# Patient Record
Sex: FEMALE | Race: WHITE | NOT HISPANIC OR LATINO | Employment: OTHER | URBAN - METROPOLITAN AREA
[De-identification: names, ages, dates, MRNs, and addresses within clinical notes are randomized per-mention and may not be internally consistent; named-entity substitution may affect disease eponyms.]

---

## 2018-03-28 ENCOUNTER — APPOINTMENT (OUTPATIENT)
Dept: RADIOLOGY | Facility: MEDICAL CENTER | Age: 83
DRG: 247 | End: 2018-03-28
Attending: EMERGENCY MEDICINE
Payer: MEDICARE

## 2018-03-28 ENCOUNTER — RESOLUTE PROFESSIONAL BILLING HOSPITAL PROF FEE (OUTPATIENT)
Dept: HOSPITALIST | Facility: MEDICAL CENTER | Age: 83
End: 2018-03-28
Payer: MEDICARE

## 2018-03-28 ENCOUNTER — HOSPITAL ENCOUNTER (INPATIENT)
Facility: MEDICAL CENTER | Age: 83
LOS: 2 days | DRG: 247 | End: 2018-03-30
Attending: EMERGENCY MEDICINE | Admitting: INTERNAL MEDICINE
Payer: MEDICARE

## 2018-03-28 DIAGNOSIS — I20.0 UNSTABLE ANGINA (HCC): ICD-10-CM

## 2018-03-28 DIAGNOSIS — R07.9 ACUTE CHEST PAIN: ICD-10-CM

## 2018-03-28 LAB
ALBUMIN SERPL BCP-MCNC: 3.7 G/DL (ref 3.2–4.9)
ALBUMIN/GLOB SERPL: 1.2 G/DL
ALP SERPL-CCNC: 80 U/L (ref 30–99)
ALT SERPL-CCNC: 10 U/L (ref 2–50)
ANION GAP SERPL CALC-SCNC: 7 MMOL/L (ref 0–11.9)
AST SERPL-CCNC: 14 U/L (ref 12–45)
BASOPHILS # BLD AUTO: 0.7 % (ref 0–1.8)
BASOPHILS # BLD: 0.07 K/UL (ref 0–0.12)
BILIRUB SERPL-MCNC: 0.2 MG/DL (ref 0.1–1.5)
BNP SERPL-MCNC: 26 PG/ML (ref 0–100)
BUN SERPL-MCNC: 26 MG/DL (ref 8–22)
CALCIUM SERPL-MCNC: 9.5 MG/DL (ref 8.5–10.5)
CHLORIDE SERPL-SCNC: 107 MMOL/L (ref 96–112)
CO2 SERPL-SCNC: 27 MMOL/L (ref 20–33)
CREAT SERPL-MCNC: 0.75 MG/DL (ref 0.5–1.4)
EKG IMPRESSION: NORMAL
EOSINOPHIL # BLD AUTO: 0.29 K/UL (ref 0–0.51)
EOSINOPHIL NFR BLD: 2.7 % (ref 0–6.9)
ERYTHROCYTE [DISTWIDTH] IN BLOOD BY AUTOMATED COUNT: 52.2 FL (ref 35.9–50)
GLOBULIN SER CALC-MCNC: 3.1 G/DL (ref 1.9–3.5)
GLUCOSE SERPL-MCNC: 112 MG/DL (ref 65–99)
HCT VFR BLD AUTO: 41.1 % (ref 37–47)
HGB BLD-MCNC: 13.7 G/DL (ref 12–16)
IMM GRANULOCYTES # BLD AUTO: 0.03 K/UL (ref 0–0.11)
IMM GRANULOCYTES NFR BLD AUTO: 0.3 % (ref 0–0.9)
LYMPHOCYTES # BLD AUTO: 2.58 K/UL (ref 1–4.8)
LYMPHOCYTES NFR BLD: 24.2 % (ref 22–41)
MCH RBC QN AUTO: 32.5 PG (ref 27–33)
MCHC RBC AUTO-ENTMCNC: 33.3 G/DL (ref 33.6–35)
MCV RBC AUTO: 97.6 FL (ref 81.4–97.8)
MONOCYTES # BLD AUTO: 0.74 K/UL (ref 0–0.85)
MONOCYTES NFR BLD AUTO: 6.9 % (ref 0–13.4)
NEUTROPHILS # BLD AUTO: 6.94 K/UL (ref 2–7.15)
NEUTROPHILS NFR BLD: 65.2 % (ref 44–72)
NRBC # BLD AUTO: 0 K/UL
NRBC BLD-RTO: 0 /100 WBC
PLATELET # BLD AUTO: 246 K/UL (ref 164–446)
PMV BLD AUTO: 9.7 FL (ref 9–12.9)
POTASSIUM SERPL-SCNC: 4 MMOL/L (ref 3.6–5.5)
PROT SERPL-MCNC: 6.8 G/DL (ref 6–8.2)
RBC # BLD AUTO: 4.21 M/UL (ref 4.2–5.4)
SODIUM SERPL-SCNC: 141 MMOL/L (ref 135–145)
TROPONIN I SERPL-MCNC: <0.01 NG/ML (ref 0–0.04)
WBC # BLD AUTO: 10.7 K/UL (ref 4.8–10.8)

## 2018-03-28 PROCEDURE — 93005 ELECTROCARDIOGRAM TRACING: CPT

## 2018-03-28 PROCEDURE — 85025 COMPLETE CBC W/AUTO DIFF WBC: CPT

## 2018-03-28 PROCEDURE — 700105 HCHG RX REV CODE 258: Performed by: INTERNAL MEDICINE

## 2018-03-28 PROCEDURE — A9270 NON-COVERED ITEM OR SERVICE: HCPCS | Performed by: INTERNAL MEDICINE

## 2018-03-28 PROCEDURE — 99223 1ST HOSP IP/OBS HIGH 75: CPT | Mod: AI | Performed by: INTERNAL MEDICINE

## 2018-03-28 PROCEDURE — 93010 ELECTROCARDIOGRAM REPORT: CPT | Performed by: INTERNAL MEDICINE

## 2018-03-28 PROCEDURE — A9270 NON-COVERED ITEM OR SERVICE: HCPCS | Performed by: EMERGENCY MEDICINE

## 2018-03-28 PROCEDURE — 94760 N-INVAS EAR/PLS OXIMETRY 1: CPT

## 2018-03-28 PROCEDURE — 93005 ELECTROCARDIOGRAM TRACING: CPT | Performed by: EMERGENCY MEDICINE

## 2018-03-28 PROCEDURE — 80053 COMPREHEN METABOLIC PANEL: CPT

## 2018-03-28 PROCEDURE — 700102 HCHG RX REV CODE 250 W/ 637 OVERRIDE(OP): Performed by: EMERGENCY MEDICINE

## 2018-03-28 PROCEDURE — 770020 HCHG ROOM/CARE - TELE (206)

## 2018-03-28 PROCEDURE — 84484 ASSAY OF TROPONIN QUANT: CPT

## 2018-03-28 PROCEDURE — 83880 ASSAY OF NATRIURETIC PEPTIDE: CPT

## 2018-03-28 PROCEDURE — 99285 EMERGENCY DEPT VISIT HI MDM: CPT

## 2018-03-28 PROCEDURE — 93005 ELECTROCARDIOGRAM TRACING: CPT | Performed by: INTERNAL MEDICINE

## 2018-03-28 PROCEDURE — 71045 X-RAY EXAM CHEST 1 VIEW: CPT

## 2018-03-28 PROCEDURE — 700102 HCHG RX REV CODE 250 W/ 637 OVERRIDE(OP): Performed by: INTERNAL MEDICINE

## 2018-03-28 RX ORDER — POLYETHYLENE GLYCOL 3350 17 G/17G
1 POWDER, FOR SOLUTION ORAL
Status: DISCONTINUED | OUTPATIENT
Start: 2018-03-28 | End: 2018-03-30 | Stop reason: HOSPADM

## 2018-03-28 RX ORDER — LEVOTHYROXINE SODIUM 0.05 MG/1
50 TABLET ORAL DAILY
Status: DISCONTINUED | OUTPATIENT
Start: 2018-03-29 | End: 2018-03-30 | Stop reason: HOSPADM

## 2018-03-28 RX ORDER — FEXOFENADINE HCL 60 MG/1
180 TABLET, FILM COATED ORAL DAILY
Status: DISCONTINUED | OUTPATIENT
Start: 2018-03-29 | End: 2018-03-30 | Stop reason: HOSPADM

## 2018-03-28 RX ORDER — METOPROLOL TARTRATE 50 MG/1
50 TABLET, FILM COATED ORAL ONCE
Status: COMPLETED | OUTPATIENT
Start: 2018-03-28 | End: 2018-03-28

## 2018-03-28 RX ORDER — FUROSEMIDE 20 MG/1
20 TABLET ORAL DAILY
Status: DISCONTINUED | OUTPATIENT
Start: 2018-03-29 | End: 2018-03-30 | Stop reason: HOSPADM

## 2018-03-28 RX ORDER — ONDANSETRON 4 MG/1
4 TABLET, ORALLY DISINTEGRATING ORAL EVERY 4 HOURS PRN
Status: DISCONTINUED | OUTPATIENT
Start: 2018-03-28 | End: 2018-03-30 | Stop reason: HOSPADM

## 2018-03-28 RX ORDER — LATANOPROST 50 UG/ML
1 SOLUTION/ DROPS OPHTHALMIC EVERY EVENING
Status: DISCONTINUED | OUTPATIENT
Start: 2018-03-28 | End: 2018-03-30 | Stop reason: HOSPADM

## 2018-03-28 RX ORDER — BISACODYL 10 MG
10 SUPPOSITORY, RECTAL RECTAL
Status: DISCONTINUED | OUTPATIENT
Start: 2018-03-28 | End: 2018-03-30 | Stop reason: HOSPADM

## 2018-03-28 RX ORDER — METOPROLOL SUCCINATE 50 MG/1
50 TABLET, EXTENDED RELEASE ORAL
Status: DISCONTINUED | OUTPATIENT
Start: 2018-03-28 | End: 2018-03-30 | Stop reason: HOSPADM

## 2018-03-28 RX ORDER — AMOXICILLIN 250 MG
2 CAPSULE ORAL 2 TIMES DAILY
Status: DISCONTINUED | OUTPATIENT
Start: 2018-03-28 | End: 2018-03-30 | Stop reason: HOSPADM

## 2018-03-28 RX ORDER — FUROSEMIDE 20 MG/1
20 TABLET ORAL DAILY
COMMUNITY

## 2018-03-28 RX ORDER — ASPIRIN 325 MG
325 TABLET ORAL
Status: DISCONTINUED | OUTPATIENT
Start: 2018-03-28 | End: 2018-03-29

## 2018-03-28 RX ORDER — ONDANSETRON 2 MG/ML
4 INJECTION INTRAMUSCULAR; INTRAVENOUS EVERY 4 HOURS PRN
Status: DISCONTINUED | OUTPATIENT
Start: 2018-03-28 | End: 2018-03-30 | Stop reason: HOSPADM

## 2018-03-28 RX ORDER — ASPIRIN 325 MG
325 TABLET ORAL ONCE
Status: COMPLETED | OUTPATIENT
Start: 2018-03-28 | End: 2018-03-28

## 2018-03-28 RX ORDER — FEXOFENADINE HCL 180 MG/1
180 TABLET ORAL DAILY
COMMUNITY

## 2018-03-28 RX ORDER — ASPIRIN 81 MG/1
324 TABLET, CHEWABLE ORAL
Status: DISCONTINUED | OUTPATIENT
Start: 2018-03-28 | End: 2018-03-29

## 2018-03-28 RX ORDER — ASPIRIN 300 MG/1
300 SUPPOSITORY RECTAL
Status: DISCONTINUED | OUTPATIENT
Start: 2018-03-28 | End: 2018-03-29

## 2018-03-28 RX ORDER — ACETAMINOPHEN 325 MG/1
650 TABLET ORAL EVERY 6 HOURS PRN
Status: DISCONTINUED | OUTPATIENT
Start: 2018-03-28 | End: 2018-03-30 | Stop reason: HOSPADM

## 2018-03-28 RX ORDER — SIMVASTATIN 40 MG
40 TABLET ORAL NIGHTLY
Status: DISCONTINUED | OUTPATIENT
Start: 2018-03-28 | End: 2018-03-30 | Stop reason: HOSPADM

## 2018-03-28 RX ORDER — SODIUM CHLORIDE 9 MG/ML
INJECTION, SOLUTION INTRAVENOUS CONTINUOUS
Status: DISCONTINUED | OUTPATIENT
Start: 2018-03-28 | End: 2018-03-29

## 2018-03-28 RX ADMIN — ASPIRIN 325 MG: 325 TABLET ORAL at 23:35

## 2018-03-28 RX ADMIN — SIMVASTATIN 40 MG: 40 TABLET, FILM COATED ORAL at 23:35

## 2018-03-28 RX ADMIN — ASPIRIN 325 MG: 325 TABLET ORAL at 20:01

## 2018-03-28 RX ADMIN — SODIUM CHLORIDE: 9 INJECTION, SOLUTION INTRAVENOUS at 21:41

## 2018-03-28 RX ADMIN — SODIUM CHLORIDE: 9 INJECTION, SOLUTION INTRAVENOUS at 23:35

## 2018-03-28 RX ADMIN — METOPROLOL TARTRATE 50 MG: 50 TABLET, FILM COATED ORAL at 20:01

## 2018-03-28 RX ADMIN — METOPROLOL SUCCINATE 50 MG: 50 TABLET, EXTENDED RELEASE ORAL at 23:35

## 2018-03-28 ASSESSMENT — COGNITIVE AND FUNCTIONAL STATUS - GENERAL
DRESSING REGULAR UPPER BODY CLOTHING: A LITTLE
WALKING IN HOSPITAL ROOM: A LITTLE
SUGGESTED CMS G CODE MODIFIER DAILY ACTIVITY: CK
MOVING TO AND FROM BED TO CHAIR: A LITTLE
PERSONAL GROOMING: A LITTLE
CLIMB 3 TO 5 STEPS WITH RAILING: A LITTLE
TOILETING: A LITTLE
HELP NEEDED FOR BATHING: A LITTLE
MOBILITY SCORE: 18
SUGGESTED CMS G CODE MODIFIER MOBILITY: CK
TURNING FROM BACK TO SIDE WHILE IN FLAT BAD: A LITTLE
DRESSING REGULAR LOWER BODY CLOTHING: A LITTLE
MOVING FROM LYING ON BACK TO SITTING ON SIDE OF FLAT BED: A LITTLE
STANDING UP FROM CHAIR USING ARMS: A LITTLE
DAILY ACTIVITIY SCORE: 19

## 2018-03-28 ASSESSMENT — PAIN SCALES - GENERAL
PAINLEVEL_OUTOF10: 0

## 2018-03-28 ASSESSMENT — COPD QUESTIONNAIRES
COPD SCREENING SCORE: 7
HAVE YOU SMOKED AT LEAST 100 CIGARETTES IN YOUR ENTIRE LIFE: YES
DURING THE PAST 4 WEEKS HOW MUCH DID YOU FEEL SHORT OF BREATH: SOME OF THE TIME
DO YOU EVER COUGH UP ANY MUCUS OR PHLEGM?: NO/ONLY WITH OCCASIONAL COLDS OR INFECTIONS

## 2018-03-28 ASSESSMENT — LIFESTYLE VARIABLES
ALCOHOL_USE: NO
EVER_SMOKED: NEVER
EVER_SMOKED: YES

## 2018-03-28 ASSESSMENT — PATIENT HEALTH QUESTIONNAIRE - PHQ9
1. LITTLE INTEREST OR PLEASURE IN DOING THINGS: NOT AT ALL
SUM OF ALL RESPONSES TO PHQ9 QUESTIONS 1 AND 2: 0
2. FEELING DOWN, DEPRESSED, IRRITABLE, OR HOPELESS: NOT AT ALL

## 2018-03-29 ENCOUNTER — PATIENT OUTREACH (OUTPATIENT)
Dept: HEALTH INFORMATION MANAGEMENT | Facility: OTHER | Age: 83
End: 2018-03-29

## 2018-03-29 PROBLEM — R07.9 CHEST PAIN: Status: ACTIVE | Noted: 2018-03-29

## 2018-03-29 PROBLEM — I10 HYPERTENSION: Status: ACTIVE | Noted: 2018-03-29

## 2018-03-29 PROBLEM — N39.0 UTI (URINARY TRACT INFECTION): Status: ACTIVE | Noted: 2018-03-29

## 2018-03-29 PROBLEM — E78.5 HYPERLIPIDEMIA: Status: ACTIVE | Noted: 2018-03-29

## 2018-03-29 LAB
ACT BLD: 329 SEC (ref 74–137)
ANION GAP SERPL CALC-SCNC: 7 MMOL/L (ref 0–11.9)
BASOPHILS # BLD AUTO: 0.6 % (ref 0–1.8)
BASOPHILS # BLD: 0.05 K/UL (ref 0–0.12)
BUN SERPL-MCNC: 28 MG/DL (ref 8–22)
CALCIUM SERPL-MCNC: 8.9 MG/DL (ref 8.5–10.5)
CHLORIDE SERPL-SCNC: 108 MMOL/L (ref 96–112)
CHOLEST SERPL-MCNC: 115 MG/DL (ref 100–199)
CO2 SERPL-SCNC: 27 MMOL/L (ref 20–33)
CREAT SERPL-MCNC: 0.82 MG/DL (ref 0.5–1.4)
EKG IMPRESSION: NORMAL
EOSINOPHIL # BLD AUTO: 0.31 K/UL (ref 0–0.51)
EOSINOPHIL NFR BLD: 3.5 % (ref 0–6.9)
ERYTHROCYTE [DISTWIDTH] IN BLOOD BY AUTOMATED COUNT: 52.5 FL (ref 35.9–50)
EST. AVERAGE GLUCOSE BLD GHB EST-MCNC: 148 MG/DL
GLUCOSE BLD-MCNC: 110 MG/DL (ref 65–99)
GLUCOSE BLD-MCNC: 127 MG/DL (ref 65–99)
GLUCOSE BLD-MCNC: 152 MG/DL (ref 65–99)
GLUCOSE BLD-MCNC: 92 MG/DL (ref 65–99)
GLUCOSE SERPL-MCNC: 151 MG/DL (ref 65–99)
HBA1C MFR BLD: 6.8 % (ref 0–5.6)
HCT VFR BLD AUTO: 37.3 % (ref 37–47)
HDLC SERPL-MCNC: 33 MG/DL
HGB BLD-MCNC: 12.2 G/DL (ref 12–16)
IMM GRANULOCYTES # BLD AUTO: 0.03 K/UL (ref 0–0.11)
IMM GRANULOCYTES NFR BLD AUTO: 0.3 % (ref 0–0.9)
INR PPP: 1.06 (ref 0.87–1.13)
LDLC SERPL CALC-MCNC: 60 MG/DL
LV EJECT FRACT  99904: 65
LV EJECT FRACT MOD 2C 99903: 57.65
LV EJECT FRACT MOD 4C 99902: 68.31
LV EJECT FRACT MOD BP 99901: 63.45
LYMPHOCYTES # BLD AUTO: 2.31 K/UL (ref 1–4.8)
LYMPHOCYTES NFR BLD: 26.3 % (ref 22–41)
MCH RBC QN AUTO: 32.1 PG (ref 27–33)
MCHC RBC AUTO-ENTMCNC: 32.7 G/DL (ref 33.6–35)
MCV RBC AUTO: 98.2 FL (ref 81.4–97.8)
MONOCYTES # BLD AUTO: 0.69 K/UL (ref 0–0.85)
MONOCYTES NFR BLD AUTO: 7.8 % (ref 0–13.4)
NEUTROPHILS # BLD AUTO: 5.4 K/UL (ref 2–7.15)
NEUTROPHILS NFR BLD: 61.5 % (ref 44–72)
NRBC # BLD AUTO: 0 K/UL
NRBC BLD-RTO: 0 /100 WBC
PLATELET # BLD AUTO: 226 K/UL (ref 164–446)
PMV BLD AUTO: 9.8 FL (ref 9–12.9)
POTASSIUM SERPL-SCNC: 3.7 MMOL/L (ref 3.6–5.5)
PROTHROMBIN TIME: 13.5 SEC (ref 12–14.6)
RBC # BLD AUTO: 3.8 M/UL (ref 4.2–5.4)
SODIUM SERPL-SCNC: 142 MMOL/L (ref 135–145)
TRIGL SERPL-MCNC: 109 MG/DL (ref 0–149)
TSH SERPL DL<=0.005 MIU/L-ACNC: 2.15 UIU/ML (ref 0.38–5.33)
WBC # BLD AUTO: 8.8 K/UL (ref 4.8–10.8)

## 2018-03-29 PROCEDURE — 700117 HCHG RX CONTRAST REV CODE 255: Performed by: INTERNAL MEDICINE

## 2018-03-29 PROCEDURE — A9270 NON-COVERED ITEM OR SERVICE: HCPCS

## 2018-03-29 PROCEDURE — C1769 GUIDE WIRE: HCPCS

## 2018-03-29 PROCEDURE — 99152 MOD SED SAME PHYS/QHP 5/>YRS: CPT

## 2018-03-29 PROCEDURE — B2111ZZ FLUOROSCOPY OF MULTIPLE CORONARY ARTERIES USING LOW OSMOLAR CONTRAST: ICD-10-PCS | Performed by: INTERNAL MEDICINE

## 2018-03-29 PROCEDURE — 700102 HCHG RX REV CODE 250 W/ 637 OVERRIDE(OP)

## 2018-03-29 PROCEDURE — 82962 GLUCOSE BLOOD TEST: CPT

## 2018-03-29 PROCEDURE — 85610 PROTHROMBIN TIME: CPT

## 2018-03-29 PROCEDURE — 93005 ELECTROCARDIOGRAM TRACING: CPT | Performed by: INTERNAL MEDICINE

## 2018-03-29 PROCEDURE — 700111 HCHG RX REV CODE 636 W/ 250 OVERRIDE (IP): Performed by: INTERNAL MEDICINE

## 2018-03-29 PROCEDURE — 770020 HCHG ROOM/CARE - TELE (206)

## 2018-03-29 PROCEDURE — 93010 ELECTROCARDIOGRAM REPORT: CPT | Performed by: INTERNAL MEDICINE

## 2018-03-29 PROCEDURE — 700111 HCHG RX REV CODE 636 W/ 250 OVERRIDE (IP)

## 2018-03-29 PROCEDURE — 99223 1ST HOSP IP/OBS HIGH 75: CPT | Performed by: INTERNAL MEDICINE

## 2018-03-29 PROCEDURE — C9600 PERC DRUG-EL COR STENT SING: HCPCS | Mod: LD

## 2018-03-29 PROCEDURE — 027034Z DILATION OF CORONARY ARTERY, ONE ARTERY WITH DRUG-ELUTING INTRALUMINAL DEVICE, PERCUTANEOUS APPROACH: ICD-10-PCS | Performed by: INTERNAL MEDICINE

## 2018-03-29 PROCEDURE — 80048 BASIC METABOLIC PNL TOTAL CA: CPT

## 2018-03-29 PROCEDURE — 700101 HCHG RX REV CODE 250

## 2018-03-29 PROCEDURE — 85347 COAGULATION TIME ACTIVATED: CPT

## 2018-03-29 PROCEDURE — B2151ZZ FLUOROSCOPY OF LEFT HEART USING LOW OSMOLAR CONTRAST: ICD-10-PCS | Performed by: INTERNAL MEDICINE

## 2018-03-29 PROCEDURE — C1874 STENT, COATED/COV W/DEL SYS: HCPCS

## 2018-03-29 PROCEDURE — 4A023N7 MEASUREMENT OF CARDIAC SAMPLING AND PRESSURE, LEFT HEART, PERCUTANEOUS APPROACH: ICD-10-PCS | Performed by: INTERNAL MEDICINE

## 2018-03-29 PROCEDURE — 307093 HCHG TR BAND RADIAL

## 2018-03-29 PROCEDURE — C1725 CATH, TRANSLUMIN NON-LASER: HCPCS

## 2018-03-29 PROCEDURE — C1894 INTRO/SHEATH, NON-LASER: HCPCS

## 2018-03-29 PROCEDURE — 93010 ELECTROCARDIOGRAM REPORT: CPT | Mod: 77 | Performed by: INTERNAL MEDICINE

## 2018-03-29 PROCEDURE — 84443 ASSAY THYROID STIM HORMONE: CPT

## 2018-03-29 PROCEDURE — 80061 LIPID PANEL: CPT

## 2018-03-29 PROCEDURE — A9270 NON-COVERED ITEM OR SERVICE: HCPCS | Performed by: INTERNAL MEDICINE

## 2018-03-29 PROCEDURE — C1887 CATHETER, GUIDING: HCPCS

## 2018-03-29 PROCEDURE — 36415 COLL VENOUS BLD VENIPUNCTURE: CPT

## 2018-03-29 PROCEDURE — 93306 TTE W/DOPPLER COMPLETE: CPT | Mod: 26 | Performed by: INTERNAL MEDICINE

## 2018-03-29 PROCEDURE — 85025 COMPLETE CBC W/AUTO DIFF WBC: CPT

## 2018-03-29 PROCEDURE — 700102 HCHG RX REV CODE 250 W/ 637 OVERRIDE(OP): Performed by: INTERNAL MEDICINE

## 2018-03-29 PROCEDURE — 93458 L HRT ARTERY/VENTRICLE ANGIO: CPT

## 2018-03-29 PROCEDURE — 360979 HCHG DIAGNOSTIC CATH

## 2018-03-29 PROCEDURE — 99153 MOD SED SAME PHYS/QHP EA: CPT

## 2018-03-29 PROCEDURE — 700105 HCHG RX REV CODE 258: Performed by: INTERNAL MEDICINE

## 2018-03-29 PROCEDURE — 93306 TTE W/DOPPLER COMPLETE: CPT

## 2018-03-29 PROCEDURE — 83036 HEMOGLOBIN GLYCOSYLATED A1C: CPT

## 2018-03-29 RX ORDER — CLOPIDOGREL BISULFATE 75 MG/1
600 TABLET ORAL ONCE
Status: ACTIVE | OUTPATIENT
Start: 2018-03-29 | End: 2018-03-30

## 2018-03-29 RX ORDER — MIDAZOLAM HYDROCHLORIDE 1 MG/ML
INJECTION INTRAMUSCULAR; INTRAVENOUS
Status: COMPLETED
Start: 2018-03-29 | End: 2018-03-29

## 2018-03-29 RX ORDER — HEPARIN SODIUM,PORCINE 1000/ML
VIAL (ML) INJECTION
Status: COMPLETED
Start: 2018-03-29 | End: 2018-03-29

## 2018-03-29 RX ORDER — VERAPAMIL HYDROCHLORIDE 2.5 MG/ML
INJECTION, SOLUTION INTRAVENOUS
Status: COMPLETED
Start: 2018-03-29 | End: 2018-03-29

## 2018-03-29 RX ORDER — LIDOCAINE HYDROCHLORIDE 20 MG/ML
INJECTION, SOLUTION INFILTRATION; PERINEURAL
Status: COMPLETED
Start: 2018-03-29 | End: 2018-03-29

## 2018-03-29 RX ORDER — CLOPIDOGREL 300 MG/1
TABLET, FILM COATED ORAL
Status: COMPLETED
Start: 2018-03-29 | End: 2018-03-29

## 2018-03-29 RX ORDER — DEXTROSE MONOHYDRATE 25 G/50ML
25 INJECTION, SOLUTION INTRAVENOUS
Status: DISCONTINUED | OUTPATIENT
Start: 2018-03-29 | End: 2018-03-30 | Stop reason: HOSPADM

## 2018-03-29 RX ORDER — CLOPIDOGREL BISULFATE 75 MG/1
75 TABLET ORAL DAILY
Status: DISCONTINUED | OUTPATIENT
Start: 2018-03-30 | End: 2018-03-30 | Stop reason: HOSPADM

## 2018-03-29 RX ORDER — NITROGLYCERIN 0.4 MG/1
0.4 TABLET SUBLINGUAL
Status: DISCONTINUED | OUTPATIENT
Start: 2018-03-29 | End: 2018-03-30 | Stop reason: HOSPADM

## 2018-03-29 RX ORDER — SODIUM CHLORIDE 9 MG/ML
INJECTION, SOLUTION INTRAVENOUS CONTINUOUS
Status: DISCONTINUED | OUTPATIENT
Start: 2018-03-29 | End: 2018-03-30

## 2018-03-29 RX ORDER — ASPIRIN 81 MG/1
81 TABLET, CHEWABLE ORAL DAILY
Status: DISCONTINUED | OUTPATIENT
Start: 2018-03-30 | End: 2018-03-30 | Stop reason: HOSPADM

## 2018-03-29 RX ADMIN — VERAPAMIL HYDROCHLORIDE 2.5 MG: 2.5 INJECTION, SOLUTION INTRAVENOUS at 12:18

## 2018-03-29 RX ADMIN — LIDOCAINE HYDROCHLORIDE: 20 INJECTION, SOLUTION INFILTRATION; PERINEURAL at 13:01

## 2018-03-29 RX ADMIN — MIDAZOLAM 2 MG: 1 INJECTION INTRAMUSCULAR; INTRAVENOUS at 13:09

## 2018-03-29 RX ADMIN — CLOPIDOGREL BISULFATE 600 MG: 300 TABLET, FILM COATED ORAL at 13:38

## 2018-03-29 RX ADMIN — FUROSEMIDE 20 MG: 20 TABLET ORAL at 09:31

## 2018-03-29 RX ADMIN — HEPARIN SODIUM: 1000 INJECTION, SOLUTION INTRAVENOUS; SUBCUTANEOUS at 12:18

## 2018-03-29 RX ADMIN — METOPROLOL SUCCINATE 50 MG: 50 TABLET, EXTENDED RELEASE ORAL at 20:21

## 2018-03-29 RX ADMIN — NITROGLYCERIN 10 ML: 20 INJECTION INTRAVENOUS at 12:18

## 2018-03-29 RX ADMIN — SITAGLIPTIN 100 MG: 100 TABLET, FILM COATED ORAL at 09:31

## 2018-03-29 RX ADMIN — SODIUM CHLORIDE: 9 INJECTION, SOLUTION INTRAVENOUS at 14:19

## 2018-03-29 RX ADMIN — FEXOFENADINE HCL 180 MG: 60 TABLET, FILM COATED ORAL at 09:30

## 2018-03-29 RX ADMIN — LEVOTHYROXINE SODIUM 50 MCG: 50 TABLET ORAL at 09:31

## 2018-03-29 RX ADMIN — HEPARIN SODIUM 2000 UNITS: 200 INJECTION, SOLUTION INTRAVENOUS at 12:19

## 2018-03-29 RX ADMIN — LATANOPROST 1 DROP: 50 SOLUTION OPHTHALMIC at 21:00

## 2018-03-29 RX ADMIN — IOHEXOL 181 ML: 350 INJECTION, SOLUTION INTRAVENOUS at 13:36

## 2018-03-29 RX ADMIN — FENTANYL CITRATE 125 MCG: 50 INJECTION, SOLUTION INTRAMUSCULAR; INTRAVENOUS at 13:25

## 2018-03-29 RX ADMIN — CEFTRIAXONE SODIUM 1 G: 10 INJECTION, POWDER, FOR SOLUTION INTRAVENOUS at 16:08

## 2018-03-29 RX ADMIN — SODIUM CHLORIDE: 9 INJECTION, SOLUTION INTRAVENOUS at 18:48

## 2018-03-29 RX ADMIN — SIMVASTATIN 40 MG: 40 TABLET, FILM COATED ORAL at 20:20

## 2018-03-29 ASSESSMENT — ENCOUNTER SYMPTOMS
COUGH: 1
SEIZURES: 0
FLANK PAIN: 0
DIAPHORESIS: 0
CHILLS: 0
STRIDOR: 0
LOSS OF CONSCIOUSNESS: 0
WEAKNESS: 0
SINUS PAIN: 0
ABDOMINAL PAIN: 0
SPUTUM PRODUCTION: 0
SHORTNESS OF BREATH: 0
SPUTUM PRODUCTION: 1
BRUISES/BLEEDS EASILY: 0
SHORTNESS OF BREATH: 1
FEVER: 0
VOMITING: 0
NAUSEA: 0
COUGH: 0
DIARRHEA: 0
RESPIRATORY NEGATIVE: 1
NERVOUS/ANXIOUS: 1
POLYDIPSIA: 0
MUSCULOSKELETAL NEGATIVE: 1
GASTROINTESTINAL NEGATIVE: 1
HEADACHES: 0
ORTHOPNEA: 1
BLURRED VISION: 0
MYALGIAS: 0
DIZZINESS: 0
CLAUDICATION: 0
ORTHOPNEA: 0
WHEEZING: 0
PSYCHIATRIC NEGATIVE: 1
EYES NEGATIVE: 1
HEMOPTYSIS: 0
NECK PAIN: 0
CONSTITUTIONAL NEGATIVE: 1
CONSTIPATION: 0
HEARTBURN: 0
DOUBLE VISION: 0
PALPITATIONS: 0
TREMORS: 0
FOCAL WEAKNESS: 0

## 2018-03-29 ASSESSMENT — PAIN SCALES - GENERAL
PAINLEVEL_OUTOF10: 0

## 2018-03-29 NOTE — PROGRESS NOTES
Pt returned from cath lab. Report taken from JESSICA Ron. Pt received 600mg Plavix prior to return to unit. VS stable, neurovascular assessment performed and WNL.

## 2018-03-29 NOTE — FACE TO FACE
Face to Face Supporting Documentation - Home Health    The encounter with this patient was in whole or in part the primary reason for home health admission.    Date of encounter:   Patient:                    MRN:                       YOB: 2018  Johanne Baxter  [id number]  9/1/1932     Home health to see patient for:  Skilled Nursing care for assessment, interventions & education    Skilled need for:  Exacerbation of Chronic Disease State Unstable angina    Skilled nursing interventions to include:  Comment: help with medications    Homebound status evidenced by:  Needs the assistance of another person in order to leave the home. Leaving home requires a considerable and taxing effort. There is a normal inability to leave the home.    Community Physician to provide follow up care: Maliha Tran M.D.     Optional Interventions? No      I certify the face to face encounter for this home health care referral meets the CMS requirements and the encounter/clinical assessment with the patient was, in whole, or in part, for the medical condition(s) listed above, which is the primary reason for home health care. Based on my clinical findings: the service(s) are medically necessary, support the need for home health care, and the homebound criteria are met.  I certify that this patient has had a face to face encounter by myself.  Dominik Flores M.D. - NPI: [id number]

## 2018-03-29 NOTE — ED NOTES
"Irregular heart rhythm. EKG performed immediately and signed by MD . Pt denies CP or SOB at this time, states \"I feel ok, don't feel bad at all right now.\" Dr. Stern at bedside now.   "

## 2018-03-29 NOTE — ED PROVIDER NOTES
ED Provider Note    CHIEF COMPLAINT  Chief Complaint   Patient presents with   • Other     Abnormal Stress Test       HPI  Johanne Baxter is a 85 y.o. female who presents to the emergency room today with complaints of chest pain. Patient has had chest pain on and off for the past 2 weeks getting worse with exertion. Her doctor had ordered a stress test on her today which was positive for reversible ischemia apex and lateral wall. Seen at [facility] emergency room and transferred by EMS to our facility. Has risk factors of diabetes hypertensive elevated cholesterol and family history. Currently denies chest pain no shortness breath fever chills or changes in bowel or bladder.    REVIEW OF SYSTEMS  See HPI for further details. All other systems are negative.     PAST MEDICAL HISTORY  Past Medical History:   Diagnosis Date   • DVT (deep venous thrombosis) (CMS-[facility]) 1992    related to hip surgery right leg   • Abnormal Pap smear    • Arthritis    • ASTHMA    • CATARACT    • Depression    • Diabetes    • Glaucoma    • Headache(784.0)    • Hyperlipidemia    • Hypertension    • MRSA (methicillin resistant staph aureus) culture positive    • Thyroid disease    • TIA (transient ischemic attack)    • TIA (transient ischemic attack)     resolved with Agrenox   • Urinary tract infection, site not specified        FAMILY HISTORY  [unfilled]    SOCIAL HISTORY  Social History     Social History   • Marital status: Single     Spouse name: N/A   • Number of children: N/A   • Years of education: N/A     Social History Main Topics   • Smoking status: Former Smoker   • Smokeless tobacco: Never Used   • Alcohol use No   • Drug use: No   • Sexual activity: Not on file     Other Topics Concern   • Not on file     Social History Narrative   • No narrative on file       SURGICAL HISTORY  Past Surgical History:   Procedure Laterality Date   • OTHER      hysterectomy, hip replacement times 3 sinus surgery       CURRENT MEDICATIONS  Home  "Medications     Reviewed by Trang Muñoz R.N. (Registered Nurse) on 03/28/18 at 1941  Med List Status: Not Addressed   Medication Last Dose Status   aspirin-dipyridamole (AGGRENOX)  MG CAPSULE SR 12 HR 7/28/2016 Active   Cyanocobalamin (EQL VITAMIN B-12 PO) 7/28/2016 Active   LEVOTHYROXINE SODIUM PO 7/28/2016 Active   Linagliptin (TRADJENTA) 5 MG TABS 7/28/2016 Active   metoprolol SR (TOPROL XL) 50 MG TB24 7/28/2016 Active   Multiple Vitamins-Minerals (PRESERVISION AREDS PO) 7/28/2016 Active   simvastatin (ZOCOR) 40 MG TABS 7/27/2016 Active   travoprost (TRAVATAN Z) 0.004 % SOLN 7/27/2016 Active                ALLERGIES  Allergies   Allergen Reactions   • Novocain    • Pcn [Penicillins]    • Sulfa Drugs        PHYSICAL EXAM  VITAL SIGNS: /89   Pulse 71   Temp 36.5 °C (97.7 °F)   Resp 20   Ht 1.651 m (5' 5\")   Wt 99.8 kg (220 lb)   SpO2 97%   BMI 36.61 kg/m²  Room air O2: 90    Constitutional: Well developed, Well nourished, No acute distress, Non-toxic appearance.   HENT: Normocephalic, Atraumatic, Bilateral external ears normal, Oropharynx moist, No oral exudates, Nose normal.   Eyes: PERRLA, EOMI, Conjunctiva normal, No discharge.   Neck: Normal range of motion, No tenderness, Supple, No stridor.   Lymphatic: No lymphadenopathy noted.   Cardiovascular: Normal heart rate, Normal rhythm, No murmurs, No rubs, No gallops.   Thorax & Lungs: Normal breath sounds, No respiratory distress, No wheezing, No chest tenderness.   Abdomen: Bowel sounds normal, Soft, No tenderness, No masses, No pulsatile masses.   Skin: Warm, Dry, No erythema, No rash.   Back: No tenderness, No CVA tenderness.   Extremities: Intact distal pulses, lower extremity edema, No tenderness, No cyanosis, No clubbing.   Musculoskeletal: Good range of motion in all major joints. No tenderness to palpation or major deformities noted.   Neurologic: Alert & oriented x 3, Normal motor function, Normal sensory function, No focal " deficits noted.   Psychiatric: Affect normal, Judgment normal, Mood normal.     EKG  Normal sinus rhythm 70 beats per minute, no ST elevation or depression, widening QRS complexes noted, MT intervals are normal ,no diagnostic Q waves noted. This is a 12-lead EKG is no previous EKG available at this time,    RADIOLOGY/PROCEDURES  DX-CHEST-PORTABLE (1 VIEW)    (Results Pending)   ECHOCARDIOGRAM COMP W/O CONT    (Results Pending)         COURSE & MEDICAL DECISION MAKING  Pertinent Labs & Imaging studies reviewed. (See chart for details)  Discussed case with hospitalist as well as with cardiology patient admitted for cardiac catheterization and further diagnostic studies. Given aspirin here in the emergency room.    FINAL IMPRESSION  1. Acute chest pain  2. Diabetes mellitus by history  3. Hypertension by history  4. Hypercholesteremia         Electronically signed by: Pablo Stern, 3/28/2018 8:36 PM

## 2018-03-29 NOTE — PROGRESS NOTES
2 RN skin check completed with Devora Mckinney. Dryness noted to bilat heels, pink blanching area noted to rt ear where glasses sit. Pink blanching area noted to buttocks.

## 2018-03-29 NOTE — PROGRESS NOTES
[facility]    Date of Service: 3/29/2018    Chief Complaint  85 y.o. female admitted 3/28/2018 with normal stress test for cardiac catheterization.    Interval Problem Update  Patient out of flap. Reportedly, coronary artery stents were placed, full report pending. Patient denies chest pain or shortness of breath.. Right forearm artery was accessed. Review of system positive for dysuria, frequency. Antibiotic empirically for UTI will be initiated. Home health order placed  Consultants/Specialty  Cardiology     Disposition  Home with home health        Review of Systems   Constitutional: Negative.  Negative for chills and fever.   HENT: Negative.  Negative for congestion and sinus pain.    Eyes: Negative.  Negative for double vision.   Respiratory: Negative.  Negative for cough, hemoptysis, sputum production, shortness of breath and stridor.    Cardiovascular: Positive for leg swelling. Negative for palpitations, orthopnea and claudication.   Gastrointestinal: Negative.  Negative for diarrhea, heartburn and nausea.   Genitourinary: Positive for dysuria, frequency and urgency. Negative for flank pain and hematuria.   Musculoskeletal: Negative.  Negative for myalgias and neck pain.   Skin: Negative.  Negative for itching and rash.   Neurological: Negative for dizziness, tremors, seizures and headaches.   Endo/Heme/Allergies: Negative for polydipsia. Does not bruise/bleed easily.   Psychiatric/Behavioral: Negative.    All other systems reviewed and are negative.     Physical Exam  Laboratory/Imaging   Hemodynamics  Temp (24hrs), Av.4 °C (97.5 °F), Min:36.1 °C (96.9 °F), Max:36.7 °C (98 °F)   Temperature: 36.4 °C (97.6 °F)  Pulse  Av.6  Min: 63  Max: 107 Heart Rate (Monitored): (!) 102  Blood Pressure : 129/68, NIBP: 146/80      Respiratory      Respiration: 15, Pulse Oximetry: 93 %, O2 Daily Delivery Respiratory : Room Air with O2 Available             Fluids  No intake or output data in  the 24 hours ending 03/29/18 1611    Nutrition  Orders Placed This Encounter   Procedures   • Diet Order     Standing Status:   Standing     Number of Occurrences:   1     Order Specific Question:   Diet:     Answer:   Cardiac [6]     Physical Exam   Constitutional: She is oriented to person, place, and time. She appears well-developed and well-nourished.   HENT:   Head: Normocephalic and atraumatic.   Eyes: EOM are normal. Pupils are equal, round, and reactive to light.   Neck: Normal range of motion. Neck supple.   Cardiovascular: Normal rate and regular rhythm.    Pulmonary/Chest: Effort normal and breath sounds normal. No respiratory distress. She has no wheezes.   Abdominal: Soft. Bowel sounds are normal. There is no tenderness.   Musculoskeletal: Normal range of motion.   Neurological: She is alert and oriented to person, place, and time.   Skin: Skin is warm and dry.   Psychiatric: She has a normal mood and affect.   Nursing note and vitals reviewed.      Recent Labs      03/28/18 1950 03/29/18   0353   WBC  10.7  8.8   RBC  4.21  3.80*   HEMOGLOBIN  13.7  12.2   HEMATOCRIT  41.1  37.3   MCV  97.6  98.2*   MCH  32.5  32.1   MCHC  33.3*  32.7*   RDW  52.2*  52.5*   PLATELETCT  246  226   MPV  9.7  9.8     Recent Labs      03/28/18   1950  03/29/18   0353   SODIUM  141  142   POTASSIUM  4.0  3.7   CHLORIDE  107  108   CO2  27  27   GLUCOSE  112*  151*   BUN  26*  28*   CREATININE  0.75  0.82   CALCIUM  9.5  8.9     Recent Labs      03/29/18   0831   INR  1.06     Recent Labs      03/28/18   1950   BNPBTYPENAT  26     Recent Labs      03/29/18   0353   TRIGLYCERIDE  109   HDL  33*   LDL  60          Assessment/Plan     Chest pain- (present on admission)   Assessment & Plan      EKG reveals no acute ischemic changes, troponin is less than 0.01  Recent NM cardiac stress test reveals reversible ischemia  Cardiology has been consulted  Patient undergone coronary angiography, reportedly stents placed  Continue  metoprolol and statin, antiplatelets per cardiology        UTI (urinary tract infection)   Assessment & Plan    Started empirically on ceftriaxone          Hyperlipidemia   Assessment & Plan    Continue statin        Hypertension   Assessment & Plan    Well-controlled  Continue Toprol-XL        Type 2 diabetes mellitus (CMS-[facility])- (present on admission)   Assessment & Plan    Insulin sliding scale with serial Accu-Cheks  Continue Januvia          Quality-Core Measures

## 2018-03-29 NOTE — H&P
Hospital Medicine History and Physical    Date of Service  3/28/2018    Chief Complaint  Chief Complaint   Patient presents with   • Other     Abnormal Stress Test       History of Presenting Illness  85 y.o. female with a past medical history of diabetes mellitus, TIA, hypertension, hyperlipidemia who presented 3/28/2018 with intermittent chest pain for the past 2 weeks. The patient reports substernal, pressure-like chest pain which is nonexertional related at 4/10 intensity. She denies any alleviating or exacerbating factors. It is associated with shortness of breath. She denies any fever or cough nausea or diaphoresis. Patient denies any smoking history. Patient reports a family history of premature cardiac disease, her father had an MI at the age of 47. The patient underwent a nuclear medicine cardiac stress test at an outlying facility that revealed reversible ischemia. Cardiology was consulted by the ER physician. The patient is scheduled to undergo coronary angiography tomorrow.      Primary Care Physician  Maliha Tran M.D.    Consultants  Cardiology    Code Status  Full Code    Review of Systems  Review of Systems   Constitutional: Negative for chills and fever.   Respiratory: Positive for shortness of breath. Negative for cough, sputum production and wheezing.    Cardiovascular: Positive for chest pain.   All other systems reviewed and are negative.       Past Medical History  Past Medical History:   Diagnosis Date   • DVT (deep venous thrombosis) (CMS-H[facility]) 1992    related to hip surgery right leg   • Abnormal Pap smear    • Arthritis    • ASTHMA    • CATARACT    • Depression    • Diabetes    • Glaucoma    • Headache(784.0)    • Hyperlipidemia    • Hypertension    • MRSA (methicillin resistant staph aureus) culture positive    • Thyroid disease    • TIA (transient ischemic attack)    • TIA (transient ischemic attack)     resolved with Agrenox   • Urinary tract infection, site not specified        Surgical  History  Past Surgical History:   Procedure Laterality Date   • OTHER      hysterectomy, hip replacement times 3 sinus surgery       Medications  No current facility-administered medications on file prior to encounter.      Current Outpatient Prescriptions on File Prior to Encounter   Medication Sig Dispense Refill   • cyanocobalamin (EQL VITAMIN B-12) 500 MCG tablet Take 1 Tab by mouth every day.     • aspirin-dipyridamole (AGGRENOX)  MG CAPSULE SR 12 HR Take 1 Cap by mouth 2 times a day.     • Multiple Vitamins-Minerals (PRESERVISION AREDS PO) Take 1 Tab by mouth 2 times a day.     • levothyroxine (SYNTHROID) 50 MCG Tab Take 50 mcg by mouth every day.     • metoprolol SR (TOPROL XL) 50 MG TB24 Take 50 mg by mouth every day.     • simvastatin (ZOCOR) 40 MG TABS Take 40 mg by mouth every evening.     • Linagliptin (TRADJENTA) 5 MG TABS Take 5 mg by mouth every day.     • travoprost (TRAVATAN Z) 0.004 % SOLN Place 1 Drop in both eyes every evening.         Family History  Family History   Problem Relation Age of Onset   • Heart Disease Mother    • Heart Disease Father    • Heart Disease Brother    • Heart Disease Sister        Social History  Social History   Substance Use Topics   • Smoking status: Former Smoker   • Smokeless tobacco: Never Used   • Alcohol use No       Allergies  Allergies   Allergen Reactions   • Novocain      Pt was unsure of the rxn   • Pcn [Penicillins]      Pt reported it makes her feel drunk   • Sulfa Drugs      Pt was unsure of rxn        Physical Exam  Laboratory   Hemodynamics  Temp (24hrs), Av.5 °C (97.7 °F), Min:36.5 °C (97.7 °F), Max:36.5 °C (97.7 °F)   Temperature: 36.5 °C (97.7 °F)  Pulse  Av.7  Min: 71  Max: 85 Heart Rate (Monitored): (!) 106  Blood Pressure : 146/89, NIBP: 130/68      Respiratory      Respiration: 20, Pulse Oximetry: 97 %             Physical Exam   Constitutional: She appears well-developed and well-nourished. No distress.   HENT:   Head:  Normocephalic and atraumatic.   Mouth/Throat: Oropharynx is clear and moist.   Eyes: Conjunctivae are normal.   Neck: Neck supple.   Cardiovascular: Normal rate, regular rhythm and normal heart sounds.    Pulmonary/Chest: Effort normal and breath sounds normal. No respiratory distress. She has no wheezes.   Nursing note and vitals reviewed.      Recent Labs      03/28/18 1950   WBC  10.7   RBC  4.21   HEMOGLOBIN  13.7   HEMATOCRIT  41.1   MCV  97.6   MCH  32.5   MCHC  33.3*   RDW  52.2*   PLATELETCT  246   MPV  9.7     Recent Labs      03/28/18 1950   SODIUM  141   POTASSIUM  4.0   CHLORIDE  107   CO2  27   GLUCOSE  112*   BUN  26*   CREATININE  0.75   CALCIUM  9.5     Recent Labs      03/28/18 1950   ALTSGPT  10   ASTSGOT  14   ALKPHOSPHAT  80   TBILIRUBIN  0.2   GLUCOSE  112*         Recent Labs      03/28/18 1950   BNPBTYPENAT  26         Lab Results   Component Value Date    TROPONINI <0.01 03/28/2018     Urinalysis:  No results found for: SPECGRAVITY, GLUCOSEUR, KETONES, NITRITE, WBCURINE, RBCURINE, BACTERIA, EPITHELCELL     Imaging  DX-CHEST-PORTABLE (1 VIEW)   Final Result      1.  Stable mild cardiomegaly.   2.  Mild pulmonary vascular congestion.   3.  Minimal LEFT midlung atelectasis or scar.   4.  No pneumonia or pulmonary edema.      ECHOCARDIOGRAM COMP W/O CONT    (Results Pending)        Assessment/Plan     I anticipate this patient will require at least two midnights for appropriate medical management, necessitating inpatient admission.    Chest pain- (present on admission)   Assessment & Plan    Concerning for ACS  EKG reveals no acute ischemic changes, troponin is less than 0.01  Recent NM cardiac stress test reveals reversible ischemia  Cardiology has been consulted  Patient will undergo coronary angiography tomorrow   She has received a full dose of aspirin  Continue metoprolol and statin        Hyperlipidemia   Assessment & Plan    Continue statin        Hypertension   Assessment & Plan     Well-controlled  Continue Toprol-XL        Type 2 diabetes mellitus (CMS-HCC)- (present on admission)   Assessment & Plan    Insulin sliding scale with serial Accu-Cheks  Continue Januvia            VTE prophylaxis: lovenox.

## 2018-03-29 NOTE — DISCHARGE PLANNING
Transitional Care Navigator:    Chart reviewed for post acute needs.  Pt is an 85 yof who lives alone in [place].  Her mobility in hospital has been limited to 10 feet, and up to BSC; her LACE score is 49.  If patient's mobility remains low, she would be an appropriate candidate for short term home health follow up.  Please consider an order for HH.

## 2018-03-29 NOTE — ED TRIAGE NOTES
Pt sent from [facility] for abnormal stress test. Pt denies CP or SOB.

## 2018-03-29 NOTE — PROGRESS NOTES
"DATE OF CONSULTATION: 3/28/18    REASON FOR CONSULTATION: Abnormal stress test    HISTORY OF PRESENT ILLNESS:   85 year old with a PMH of HTN, DLD, TIAx2 presented from [place] after having an abnormal NM- stress test at an outside facility. Patient reports having 2 week symptomology of substernal chest pain which is atypical in nature, aggravated with lying down, not associated with exertion, not associated with rest, and \"achy\" in nature. This is associated with shortness of breath, productive cough with white phlegm production, and exertional dyspnea. Denies any palpitations, dizziness, LOC, nausea, vomiting, blurry vision, or active tobacco abuse.     She has an extensive family Hx of CAD. Ex-smoker quit in the 60's.     PAST MEDICAL HISTORY:  Past Medical History:   Diagnosis Date   • Abnormal Pap smear    • Arthritis    • ASTHMA    • CATARACT    • Depression    • Diabetes    • DVT (deep venous thrombosis) (CMS-[facility]) 1992    related to hip surgery right leg   • Glaucoma    • Headache(784.0)    • Hyperlipidemia    • Hypertension    • MRSA (methicillin resistant staph aureus) culture positive    • Thyroid disease    • TIA (transient ischemic attack)    • TIA (transient ischemic attack)     resolved with Agrenox   • Urinary tract infection, site not specified          SURGICAL HISTORY:  Past Surgical History:   Procedure Laterality Date   • OTHER      hysterectomy, hip replacement times 3 sinus surgery   • OTHER ORTHOPEDIC SURGERY Right     rtr hip x3, last on in 2006         SOCIAL HISTORY:  Social History     Social History   • Marital status: Single     Spouse name: N/A   • Number of children: N/A   • Years of education: N/A     Occupational History   • Not on file.     Social History Main Topics   • Smoking status: Former Smoker   • Smokeless tobacco: Never Used   • Alcohol use No   • Drug use: No   • Sexual activity: Not on file     Other Topics Concern   • Not on file     Social History Narrative   • No " narrative on file         FAMILY HISTORY:  Family History   Problem Relation Age of Onset   • Heart Disease Mother    • Heart Disease Father    • Heart Disease Brother    • Heart Disease Sister          HOME MEDICATIONS:   Prior to Admission medications    Medication Sig Start Date End Date Taking? Authorizing Provider   furosemide (LASIX) 20 MG Tab Take 20 mg by mouth every day.   Yes Physician Outpatient   fexofenadine (ALLEGRA) 180 MG tablet Take 180 mg by mouth every day.   Yes Physician Outpatient   cyanocobalamin (EQL VITAMIN B-12) 500 MCG tablet Take 1 Tab by mouth every day.    Physician Outpatient   aspirin-dipyridamole (AGGRENOX)  MG CAPSULE SR 12 HR Take 1 Cap by mouth 2 times a day.    Physician Outpatient   Multiple Vitamins-Minerals (PRESERVISION AREDS PO) Take 1 Tab by mouth 2 times a day.    Physician Outpatient   levothyroxine (SYNTHROID) 50 MCG Tab Take 50 mcg by mouth every day.    Physician Outpatient   metoprolol SR (TOPROL XL) 50 MG TB24 Take 50 mg by mouth every day.    Physician Outpatient   simvastatin (ZOCOR) 40 MG TABS Take 40 mg by mouth every evening.    Physician Outpatient   Linagliptin (TRADJENTA) 5 MG TABS Take 5 mg by mouth every day.    Physician Outpatient   travoprost (TRAVATAN Z) 0.004 % SOLN Place 1 Drop in both eyes every evening.    Physician Outpatient         REVIEW OF SYSTEMS:  Review of Systems   Constitutional: Negative for chills, diaphoresis and fever.   HENT: Negative for hearing loss.    Eyes: Negative for blurred vision.   Respiratory: Positive for cough, sputum production and shortness of breath. Negative for hemoptysis.    Cardiovascular: Positive for chest pain, orthopnea and leg swelling. Negative for palpitations and claudication.   Gastrointestinal: Negative for abdominal pain, constipation, diarrhea, nausea and vomiting.   Genitourinary: Negative for dysuria.   Musculoskeletal: Negative for myalgias.   Skin: Negative for rash.   Neurological: Negative  "for dizziness, focal weakness, loss of consciousness and weakness.   Psychiatric/Behavioral: The patient is nervous/anxious.    All other systems reviewed and are negative.      PHYSICAL EXAMINATION:  Vitals:    03/28/18 2346 03/29/18 0023 03/29/18 0411 03/29/18 0820   BP:  119/65 119/64 129/68   Pulse: (!) 107 77 63 73   Resp: (!) 50 16 18 15   Temp:  36.1 °C (96.9 °F) 36.4 °C (97.5 °F) 36.4 °C (97.6 °F)   SpO2: 98% 90% 98% 93%   Weight:       Height:         Body mass index is 36.47 kg/m².  /68   Pulse 73   Temp 36.4 °C (97.6 °F)   Resp 15   Ht 1.651 m (5' 5\")   Wt 99.4 kg (219 lb 2.2 oz)   SpO2 93%   Breastfeeding? No   BMI 36.47 kg/m²   O2 therapy: Pulse Oximetry: 93 %, O2 (LPM): 2, O2 Delivery: Silicone Nasal Cannula    Physical Exam   Constitutional: She is oriented to person, place, and time and well-developed, well-nourished, and in no distress. No distress.   HENT:   Head: Normocephalic and atraumatic.   No carotid bruits   Eyes: EOM are normal.   Neck: Normal range of motion. Neck supple.   JVD cannot be assessed due to thick neck.    Cardiovascular: Normal rate, regular rhythm and normal heart sounds.  Exam reveals no gallop and no friction rub.    No murmur heard.  Pulmonary/Chest: Effort normal and breath sounds normal. No respiratory distress. She has no wheezes. She has no rales.   Abdominal: Soft. Bowel sounds are normal.   Musculoskeletal: Normal range of motion. She exhibits edema.   Neurological: She is alert and oriented to person, place, and time. GCS score is 15.   Skin: Skin is warm. She is not diaphoretic.   Psychiatric: Mood and affect normal.       LABORATORY DATA:     Recent Results (from the past 24 hour(s))   CBC WITH DIFFERENTIAL    Collection Time: 03/28/18  7:50 PM   Result Value Ref Range    WBC 10.7 4.8 - 10.8 K/uL    RBC 4.21 4.20 - 5.40 M/uL    Hemoglobin 13.7 12.0 - 16.0 g/dL    Hematocrit 41.1 37.0 - 47.0 %    MCV 97.6 81.4 - 97.8 fL    MCH 32.5 27.0 - 33.0 pg    " MCHC 33.3 (L) 33.6 - 35.0 g/dL    RDW 52.2 (H) 35.9 - 50.0 fL    Platelet Count 246 164 - 446 K/uL    MPV 9.7 9.0 - 12.9 fL    Neutrophils-Polys 65.20 44.00 - 72.00 %    Lymphocytes 24.20 22.00 - 41.00 %    Monocytes 6.90 0.00 - 13.40 %    Eosinophils 2.70 0.00 - 6.90 %    Basophils 0.70 0.00 - 1.80 %    Immature Granulocytes 0.30 0.00 - 0.90 %    Nucleated RBC 0.00 /100 WBC    Neutrophils (Absolute) 6.94 2.00 - 7.15 K/uL    Lymphs (Absolute) 2.58 1.00 - 4.80 K/uL    Monos (Absolute) 0.74 0.00 - 0.85 K/uL    Eos (Absolute) 0.29 0.00 - 0.51 K/uL    Baso (Absolute) 0.07 0.00 - 0.12 K/uL    Immature Granulocytes (abs) 0.03 0.00 - 0.11 K/uL    NRBC (Absolute) 0.00 K/uL   COMP METABOLIC PANEL    Collection Time: 03/28/18  7:50 PM   Result Value Ref Range    Sodium 141 135 - 145 mmol/L    Potassium 4.0 3.6 - 5.5 mmol/L    Chloride 107 96 - 112 mmol/L    Co2 27 20 - 33 mmol/L    Anion Gap 7.0 0.0 - 11.9    Glucose 112 (H) 65 - 99 mg/dL    Bun 26 (H) 8 - 22 mg/dL    Creatinine 0.75 0.50 - 1.40 mg/dL    Calcium 9.5 8.5 - 10.5 mg/dL    AST(SGOT) 14 12 - 45 U/L    ALT(SGPT) 10 2 - 50 U/L    Alkaline Phosphatase 80 30 - 99 U/L    Total Bilirubin 0.2 0.1 - 1.5 mg/dL    Albumin 3.7 3.2 - 4.9 g/dL    Total Protein 6.8 6.0 - 8.2 g/dL    Globulin 3.1 1.9 - 3.5 g/dL    A-G Ratio 1.2 g/dL   TROPONIN    Collection Time: 03/28/18  7:50 PM   Result Value Ref Range    Troponin I <0.01 0.00 - 0.04 ng/mL   BTYPE NATRIURETIC PEPTIDE    Collection Time: 03/28/18  7:50 PM   Result Value Ref Range    B Natriuretic Peptide 26 0 - 100 pg/mL   ESTIMATED GFR    Collection Time: 03/28/18  7:50 PM   Result Value Ref Range    GFR If African American >60 >60 mL/min/1.73 m 2    GFR If Non African American >60 >60 mL/min/1.73 m 2   EKG (ER)    Collection Time: 03/28/18 11:38 PM   Result Value Ref Range    Report       [facility]    Test Date:  2018-03-28  Pt Name:    ANNA SHERMAN               Department: ER  MRN:        [id number]                       Room:       [facility]  Gender:     Female                       Technician: GIANFRANCO  :        [id number]                   Requested By:ER TRIAGE PROTOCOL  Order #:    [id number]                    Reading MD: Orestes Salcedo MD    Measurements  Intervals                                Axis  Rate:       71                           P:          0  NY:         248                          QRS:        18  QRSD:       90                           T:          57  QT:         404  QTc:        439    Interpretive Statements  SINUS RHYTHM  FIRST DEGREE AV BLOCK  INFERIOR ST ELEVATION PROBABLY BENIGN EARLY REPOLARIZATION  Compared to ECG 2016 14:09:47  No significant changes    Electronically Signed On 3- 8:45:59 PDT by Orestes Salcedo MD     CBC with Differential    Collection Time: 18  3:53 AM   Result Value Ref Range    WBC 8.8 4.8 - 10.8 K/uL    RBC 3.80 (L) 4.20 - 5.40 M/uL    Hemoglobin 12.2 12.0 - 16.0 g/dL    Hematocrit 37.3 37.0 - 47.0 %    MCV 98.2 (H) 81.4 - 97.8 fL    MCH 32.1 27.0 - 33.0 pg    MCHC 32.7 (L) 33.6 - 35.0 g/dL    RDW 52.5 (H) 35.9 - 50.0 fL    Platelet Count 226 164 - 446 K/uL    MPV 9.8 9.0 - 12.9 fL    Neutrophils-Polys 61.50 44.00 - 72.00 %    Lymphocytes 26.30 22.00 - 41.00 %    Monocytes 7.80 0.00 - 13.40 %    Eosinophils 3.50 0.00 - 6.90 %    Basophils 0.60 0.00 - 1.80 %    Immature Granulocytes 0.30 0.00 - 0.90 %    Nucleated RBC 0.00 /100 WBC    Neutrophils (Absolute) 5.40 2.00 - 7.15 K/uL    Lymphs (Absolute) 2.31 1.00 - 4.80 K/uL    Monos (Absolute) 0.69 0.00 - 0.85 K/uL    Eos (Absolute) 0.31 0.00 - 0.51 K/uL    Baso (Absolute) 0.05 0.00 - 0.12 K/uL    Immature Granulocytes (abs) 0.03 0.00 - 0.11 K/uL    NRBC (Absolute) 0.00 K/uL   Basic Metabolic Panel (BMP)    Collection Time: 18  3:53 AM   Result Value Ref Range    Sodium 142 135 - 145 mmol/L    Potassium 3.7 3.6 - 5.5 mmol/L    Chloride 108 96 - 112 mmol/L    Co2 27 20 - 33 mmol/L    Glucose 151 (H) 65 - 99 mg/dL     Bun 28 (H) 8 - 22 mg/dL    Creatinine 0.82 0.50 - 1.40 mg/dL    Calcium 8.9 8.5 - 10.5 mg/dL    Anion Gap 7.0 0.0 - 11.9   TSH (Thyroid Stimulating Hormone)    Collection Time: 18  3:53 AM   Result Value Ref Range    TSH 2.150 0.380 - 5.330 uIU/mL   Lipid Profile (Lipid Panel) Fasting    Collection Time: 18  3:53 AM   Result Value Ref Range    Cholesterol,Tot 115 100 - 199 mg/dL    Triglycerides 109 0 - 149 mg/dL    HDL 33 (A) >=40 mg/dL    LDL 60 <100 mg/dL   ESTIMATED GFR    Collection Time: 18  3:53 AM   Result Value Ref Range    GFR If African American >60 >60 mL/min/1.73 m 2    GFR If Non African American >60 >60 mL/min/1.73 m 2   EKG in four (4) hours    Collection Time: 18  5:47 AM   Result Value Ref Range    Report       [facility]    Test Date:  2018  Pt Name:    ANNA WHITAKER               Department: [facility]  MRN:        [id number]                      Room:       [facility]  Gender:     Female                       Technician: GIANFRANCO  :        [id number]                   Requested By:EASTON VALDEZ  Order #:    [id number]                    Reading MD:    Measurements  Intervals                                Axis  Rate:       66                           P:          62  NJ:         244                          QRS:        80  QRSD:       86                           T:          69  QT:         404  QTc:        424    Interpretive Statements  SINUS RHYTHM  FIRST DEGREE AV BLOCK  Compared to ECG 2018 23:38:13  No significant changes     ACCU-CHEK GLUCOSE    Collection Time: 18  6:06 AM   Result Value Ref Range    Glucose - Accu-Ck 110 (H) 65 - 99 mg/dL   PROTHROMBIN TIME    Collection Time: 18  8:31 AM   Result Value Ref Range    PT 13.5 12.0 - 14.6 sec    INR 1.06 0.87 - 1.13       DX-CHEST-PORTABLE (1 VIEW)   Final Result      1.  Stable mild cardiomegaly.   2.  Mild pulmonary vascular congestion.   3.  Minimal LEFT midlung atelectasis or scar.   4.  No  pneumonia or pulmonary edema.      ECHOCARDIOGRAM COMP W/O CONT    (Results Pending)       ASSESSMENT AND PLAN:     //Unstable angina  //Positive stress test for ischemia  //Hx of tobacco abuse  //HTN  //DLD  //NIDDM  //Obesity  //Hx of TIA's x 2      - Planned for cardiac catheterization today  - Recommend  daily therapy. Hold aggrenox.   - Zocor 40 mg OD. Normal lipid panel  - Metoprolol XL 50 OD  - Lasix 20 mg OD  - PRN nitrostat  - DM management as per primary team.   - F/U on TTE today      Patient is agreeable to long term DAPT therapy if stents are placed. She has good follow up in [place].       Thank you for the consult. Cardiology will continue to follow.     Quality Measures  Quality-Core Measures

## 2018-03-29 NOTE — CONSULTS
Reason for Consult:  Asked by Dr Stern and Dr. Tran to see this patient with abnormal stress test with chest pains  Patient's PCP: Maliha Tran M.D.    CC:   Chief Complaint   Patient presents with   • Other     Abnormal Stress Test       HPI: This is a pleasant 85-year-old woman prior history of diabetes hypertension recurrent TIAs who is on Aggrenox who is being evaluated for symptoms of heart failure increased lower extremity edema although she does have a remote history of DVT and chest pains especially at night or she feels chest pressure without radiation some nausea some shortness of breath she reports this somewhat improved by sitting up in the chair.    Medications / Drug list prior to admission:  No current facility-administered medications on file prior to encounter.      Current Outpatient Prescriptions on File Prior to Encounter   Medication Sig Dispense Refill   • Cyanocobalamin (EQL VITAMIN B-12 PO) Take 1 Tab by mouth every day.     • aspirin-dipyridamole (AGGRENOX)  MG CAPSULE SR 12 HR Take 1 Cap by mouth 2 times a day.     • Multiple Vitamins-Minerals (PRESERVISION AREDS PO) Take 1 Tab by mouth 2 times a day.     • LEVOTHYROXINE SODIUM PO Take 12.5 Caps by mouth every day.     • metoprolol SR (TOPROL XL) 50 MG TB24 Take 50 mg by mouth every day.     • simvastatin (ZOCOR) 40 MG TABS Take 40 mg by mouth every evening.     • Linagliptin (TRADJENTA) 5 MG TABS Take 1 Tab by mouth every day.     • travoprost (TRAVATAN Z) 0.004 % SOLN Place 1 Drop in both eyes every evening.         Current list of administered Medications:    Current Facility-Administered Medications:   •  NS infusion, , Intravenous, Continuous, Cole Anderson M.D.    Current Outpatient Prescriptions:   •  Cyanocobalamin (EQL VITAMIN B-12 PO), Take 1 Tab by mouth every day., Disp: , Rfl:   •  aspirin-dipyridamole (AGGRENOX)  MG CAPSULE SR 12 HR, Take 1 Cap by mouth 2 times a day., Disp: , Rfl:   •  Multiple  Vitamins-Minerals (PRESERVISION AREDS PO), Take 1 Tab by mouth 2 times a day., Disp: , Rfl:   •  LEVOTHYROXINE SODIUM PO, Take 12.5 Caps by mouth every day., Disp: , Rfl:   •  metoprolol SR (TOPROL XL) 50 MG TB24, Take 50 mg by mouth every day., Disp: , Rfl:   •  simvastatin (ZOCOR) 40 MG TABS, Take 40 mg by mouth every evening., Disp: , Rfl:   •  Linagliptin (TRADJENTA) 5 MG TABS, Take 1 Tab by mouth every day., Disp: , Rfl:   •  travoprost (TRAVATAN Z) 0.004 % SOLN, Place 1 Drop in both eyes every evening., Disp: , Rfl:     Past Medical History:   Diagnosis Date   • Abnormal Pap smear    • Arthritis    • ASTHMA    • CATARACT    • Depression    • Diabetes    • DVT (deep venous thrombosis) ([facility]) 1992    related to hip surgery right leg   • Glaucoma    • Headache(784.0)    • Hyperlipidemia    • Hypertension    • MRSA (methicillin resistant staph aureus) culture positive    • Thyroid disease    • TIA (transient ischemic attack)    • TIA (transient ischemic attack)     resolved with Agrenox   • Urinary tract infection, site not specified        Past Surgical History:   Procedure Laterality Date   • OTHER      hysterectomy, hip replacement times 3 sinus surgery       Family History   Problem Relation Age of Onset   • Heart Disease Mother    • Heart Disease Father    • Heart Disease Brother    • Heart Disease Sister        Social History     Social History   • Marital status: Single     Spouse name: N/A   • Number of children: N/A   • Years of education: N/A     Occupational History   • Not on file.     Social History Main Topics   • Smoking status: Former Smoker   • Smokeless tobacco: Never Used   • Alcohol use No   • Drug use: No   • Sexual activity: Not on file     Other Topics Concern   • Not on file     Social History Narrative   • No narrative on file       ALLERGIES:  Allergies   Allergen Reactions   • Novocain    • Pcn [Penicillins]    • Sulfa Drugs        Review of systems:  A detailed review of symptoms was  "reviewed with patient. This is reviewed in H&P and PMH. ALL OTHERS reviewed and negative    Physical exam:  Patient Vitals for the past 24 hrs:   BP Temp Pulse Resp SpO2 Height Weight   03/28/18 2030 - - 71 20 97 % - -   03/28/18 2026 - - - - - 1.651 m (5' 5\") 99.8 kg (220 lb)   03/28/18 2000 - - 71 20 97 % - -   03/28/18 1926 146/89 36.5 °C (97.7 °F) 85 18 96 % - -       General: No acute distress.   HEENT: OP clear   Neck: No bruits No JVD.   CVS: RRR. S1 + S2. No M/R/G  Resp: CTAB. No wheezing or crackles/rhonchi.  Abdomen: Soft, NT, ND,  Skin: Grossly nothing acute no obvious rashes  Neurological: grossly within normal range   Extremities: Pulse 2+ in b/l LE. 1+ edema. No cyanosis.     Data:  Laboratory studies:  Recent Results (from the past 24 hour(s))   CBC WITH DIFFERENTIAL    Collection Time: 03/28/18  7:50 PM   Result Value Ref Range    WBC 10.7 4.8 - 10.8 K/uL    RBC 4.21 4.20 - 5.40 M/uL    Hemoglobin 13.7 12.0 - 16.0 g/dL    Hematocrit 41.1 37.0 - 47.0 %    MCV 97.6 81.4 - 97.8 fL    MCH 32.5 27.0 - 33.0 pg    MCHC 33.3 (L) 33.6 - 35.0 g/dL    RDW 52.2 (H) 35.9 - 50.0 fL    Platelet Count 246 164 - 446 K/uL    MPV 9.7 9.0 - 12.9 fL    Neutrophils-Polys 65.20 44.00 - 72.00 %    Lymphocytes 24.20 22.00 - 41.00 %    Monocytes 6.90 0.00 - 13.40 %    Eosinophils 2.70 0.00 - 6.90 %    Basophils 0.70 0.00 - 1.80 %    Immature Granulocytes 0.30 0.00 - 0.90 %    Nucleated RBC 0.00 /100 WBC    Neutrophils (Absolute) 6.94 2.00 - 7.15 K/uL    Lymphs (Absolute) 2.58 1.00 - 4.80 K/uL    Monos (Absolute) 0.74 0.00 - 0.85 K/uL    Eos (Absolute) 0.29 0.00 - 0.51 K/uL    Baso (Absolute) 0.07 0.00 - 0.12 K/uL    Immature Granulocytes (abs) 0.03 0.00 - 0.11 K/uL    NRBC (Absolute) 0.00 K/uL   COMP METABOLIC PANEL    Collection Time: 03/28/18  7:50 PM   Result Value Ref Range    Sodium 141 135 - 145 mmol/L    Potassium 4.0 3.6 - 5.5 mmol/L    Chloride 107 96 - 112 mmol/L    Co2 27 20 - 33 mmol/L    Anion Gap 7.0 0.0 - 11.9 "    Glucose 112 (H) 65 - 99 mg/dL    Bun 26 (H) 8 - 22 mg/dL    Creatinine 0.75 0.50 - 1.40 mg/dL    Calcium 9.5 8.5 - 10.5 mg/dL    AST(SGOT) 14 12 - 45 U/L    ALT(SGPT) 10 2 - 50 U/L    Alkaline Phosphatase 80 30 - 99 U/L    Total Bilirubin 0.2 0.1 - 1.5 mg/dL    Albumin 3.7 3.2 - 4.9 g/dL    Total Protein 6.8 6.0 - 8.2 g/dL    Globulin 3.1 1.9 - 3.5 g/dL    A-G Ratio 1.2 g/dL   TROPONIN    Collection Time: 03/28/18  7:50 PM   Result Value Ref Range    Troponin I <0.01 0.00 - 0.04 ng/mL   BTYPE NATRIURETIC PEPTIDE    Collection Time: 03/28/18  7:50 PM   Result Value Ref Range    B Natriuretic Peptide 26 0 - 100 pg/mL   ESTIMATED GFR    Collection Time: 03/28/18  7:50 PM   Result Value Ref Range    GFR If African American >60 >60 mL/min/1.73 m 2    GFR If Non African American >60 >60 mL/min/1.73 m 2       Imaging:  DX-CHEST-PORTABLE (1 VIEW)    (Results Pending)   ECHOCARDIOGRAM COMP W/O CONT    (Results Pending)           EKG : personally reviewed by me sinus rhythm with normal axis and intervals    Pharmacologic stress test report shows moderate ischemia in the lateral wall preserved ejection fraction    All pertinent features of laboratory and imaging reviewed including primary images where applicable    Assessment / Plan:  Unstable angina positive stress test - despite her age she has well-managed chronic medical conditions and therefore be a good candidate for more invasive evaluation for her abnormal stress test she is obviously higher risk for ischemic heart disease due to her risk factors. We did discuss if she is found to have coronary disease and would benefit from stenting that she would have to switch from her Aggrenox to aspirin and Plavix.      Has been on aspirin  She is on proper statin  Beta Blockers  No smoker  Check echo for ejection fraction  Cardiac catheterization in a.m.      It is my pleasure to participate in the care of Ms. Baxter.  Please do not hesitate to contact me with questions or  concerns.    Cole Anderson MD PhD [facility]  Cardiologist [facility]    3/28/2018

## 2018-03-29 NOTE — ED NOTES
Med rec completed by interview with patient at bedside and report from [facility]  No abx in past 30 days  Allergies reviewed (pt was unsure of the rxns to the allergies, reported feeling drunk of PCNs)

## 2018-03-29 NOTE — ASSESSMENT & PLAN NOTE
EKG reveals no acute ischemic changes, troponin is less than 0.01  Recent NM cardiac stress test reveals reversible ischemia  Cardiology has been consulted  Patient undergone coronary angiography, reportedly stents placed  Continue metoprolol and statin, antiplatelets per cardiology

## 2018-03-30 VITALS
BODY MASS INDEX: 36.51 KG/M2 | DIASTOLIC BLOOD PRESSURE: 79 MMHG | HEIGHT: 65 IN | SYSTOLIC BLOOD PRESSURE: 126 MMHG | OXYGEN SATURATION: 90 % | HEART RATE: 64 BPM | RESPIRATION RATE: 15 BRPM | WEIGHT: 219.14 LBS | TEMPERATURE: 97.5 F

## 2018-03-30 LAB
ANION GAP SERPL CALC-SCNC: 9 MMOL/L (ref 0–11.9)
BUN SERPL-MCNC: 23 MG/DL (ref 8–22)
CALCIUM SERPL-MCNC: 8.9 MG/DL (ref 8.5–10.5)
CHLORIDE SERPL-SCNC: 107 MMOL/L (ref 96–112)
CO2 SERPL-SCNC: 23 MMOL/L (ref 20–33)
CREAT SERPL-MCNC: 0.69 MG/DL (ref 0.5–1.4)
EKG IMPRESSION: NORMAL
ERYTHROCYTE [DISTWIDTH] IN BLOOD BY AUTOMATED COUNT: 53.3 FL (ref 35.9–50)
GLUCOSE BLD-MCNC: 123 MG/DL (ref 65–99)
GLUCOSE BLD-MCNC: 76 MG/DL (ref 65–99)
GLUCOSE SERPL-MCNC: 117 MG/DL (ref 65–99)
HCT VFR BLD AUTO: 41.5 % (ref 37–47)
HGB BLD-MCNC: 13 G/DL (ref 12–16)
MCH RBC QN AUTO: 31 PG (ref 27–33)
MCHC RBC AUTO-ENTMCNC: 31.3 G/DL (ref 33.6–35)
MCV RBC AUTO: 98.8 FL (ref 81.4–97.8)
PLATELET # BLD AUTO: 217 K/UL (ref 164–446)
PMV BLD AUTO: 9.5 FL (ref 9–12.9)
POTASSIUM SERPL-SCNC: 3.9 MMOL/L (ref 3.6–5.5)
RBC # BLD AUTO: 4.2 M/UL (ref 4.2–5.4)
SODIUM SERPL-SCNC: 139 MMOL/L (ref 135–145)
WBC # BLD AUTO: 10.7 K/UL (ref 4.8–10.8)

## 2018-03-30 PROCEDURE — A9270 NON-COVERED ITEM OR SERVICE: HCPCS | Performed by: INTERNAL MEDICINE

## 2018-03-30 PROCEDURE — 700102 HCHG RX REV CODE 250 W/ 637 OVERRIDE(OP): Performed by: INTERNAL MEDICINE

## 2018-03-30 PROCEDURE — 99239 HOSP IP/OBS DSCHRG MGMT >30: CPT | Performed by: INTERNAL MEDICINE

## 2018-03-30 PROCEDURE — 700111 HCHG RX REV CODE 636 W/ 250 OVERRIDE (IP): Performed by: INTERNAL MEDICINE

## 2018-03-30 PROCEDURE — 93010 ELECTROCARDIOGRAM REPORT: CPT | Performed by: INTERNAL MEDICINE

## 2018-03-30 PROCEDURE — 82962 GLUCOSE BLOOD TEST: CPT

## 2018-03-30 PROCEDURE — 36415 COLL VENOUS BLD VENIPUNCTURE: CPT

## 2018-03-30 PROCEDURE — 80048 BASIC METABOLIC PNL TOTAL CA: CPT

## 2018-03-30 PROCEDURE — 93005 ELECTROCARDIOGRAM TRACING: CPT | Performed by: INTERNAL MEDICINE

## 2018-03-30 PROCEDURE — 85027 COMPLETE CBC AUTOMATED: CPT

## 2018-03-30 RX ORDER — CEFUROXIME AXETIL 250 MG/1
250 TABLET ORAL 2 TIMES DAILY
Qty: 8 TAB | Refills: 0 | Status: SHIPPED | OUTPATIENT
Start: 2018-03-30 | End: 2018-04-03

## 2018-03-30 RX ORDER — ASPIRIN 81 MG/1
81 TABLET, CHEWABLE ORAL DAILY
Qty: 100 TAB | Refills: 0 | Status: SHIPPED | OUTPATIENT
Start: 2018-03-31

## 2018-03-30 RX ORDER — CLOPIDOGREL BISULFATE 75 MG/1
75 TABLET ORAL DAILY
Qty: 30 TAB | Refills: 1 | Status: SHIPPED | OUTPATIENT
Start: 2018-03-31

## 2018-03-30 RX ADMIN — CEFTRIAXONE SODIUM 1 G: 10 INJECTION, POWDER, FOR SOLUTION INTRAVENOUS at 09:00

## 2018-03-30 RX ADMIN — SITAGLIPTIN 100 MG: 100 TABLET, FILM COATED ORAL at 09:37

## 2018-03-30 RX ADMIN — FUROSEMIDE 20 MG: 20 TABLET ORAL at 09:39

## 2018-03-30 RX ADMIN — CLOPIDOGREL 75 MG: 75 TABLET, FILM COATED ORAL at 09:39

## 2018-03-30 RX ADMIN — LEVOTHYROXINE SODIUM 50 MCG: 50 TABLET ORAL at 05:40

## 2018-03-30 RX ADMIN — LATANOPROST 1 DROP: 50 SOLUTION OPHTHALMIC at 09:39

## 2018-03-30 RX ADMIN — ASPIRIN 81 MG: 81 TABLET, CHEWABLE ORAL at 09:39

## 2018-03-30 RX ADMIN — ENOXAPARIN SODIUM 40 MG: 100 INJECTION SUBCUTANEOUS at 09:38

## 2018-03-30 RX ADMIN — FEXOFENADINE HCL 180 MG: 60 TABLET, FILM COATED ORAL at 09:38

## 2018-03-30 ASSESSMENT — ENCOUNTER SYMPTOMS
ORTHOPNEA: 0
MYALGIAS: 0
NERVOUS/ANXIOUS: 0
SPUTUM PRODUCTION: 0
CONSTIPATION: 0
CLAUDICATION: 0
WEAKNESS: 0
HEMOPTYSIS: 0
SHORTNESS OF BREATH: 0
COUGH: 0
BLURRED VISION: 0
DIAPHORESIS: 0
VOMITING: 0
NAUSEA: 0
DIARRHEA: 0
ABDOMINAL PAIN: 0
CHILLS: 0
PALPITATIONS: 0
FEVER: 0
LOSS OF CONSCIOUSNESS: 0
DIZZINESS: 0
FOCAL WEAKNESS: 0

## 2018-03-30 ASSESSMENT — COGNITIVE AND FUNCTIONAL STATUS - GENERAL
WALKING IN HOSPITAL ROOM: A LITTLE
TURNING FROM BACK TO SIDE WHILE IN FLAT BAD: A LITTLE
DAILY ACTIVITIY SCORE: 19
HELP NEEDED FOR BATHING: A LITTLE
TOILETING: A LITTLE
STANDING UP FROM CHAIR USING ARMS: A LITTLE
MOVING TO AND FROM BED TO CHAIR: A LITTLE
MOBILITY SCORE: 18
SUGGESTED CMS G CODE MODIFIER DAILY ACTIVITY: CK
SUGGESTED CMS G CODE MODIFIER MOBILITY: CK
CLIMB 3 TO 5 STEPS WITH RAILING: A LITTLE
DRESSING REGULAR UPPER BODY CLOTHING: A LITTLE
DRESSING REGULAR LOWER BODY CLOTHING: A LITTLE
MOVING FROM LYING ON BACK TO SITTING ON SIDE OF FLAT BED: A LITTLE
PERSONAL GROOMING: A LITTLE

## 2018-03-30 ASSESSMENT — PAIN SCALES - GENERAL: PAINLEVEL_OUTOF10: 0

## 2018-03-30 NOTE — DISCHARGE PLANNING
"Pt reports her granddaughter, Kinza, cannot transport pt today, \"she is moving.\"  Pt reports she has no contact numbers, \"they are all at home.\"  IRMA contacted [facility], [phone] to schedule.  MTM reports transport they will contact SW or bedside nurse on d/c time.  MT reports \"should be around 1400 or 1500.\"  SW communicated with bedside nurse.     Plan:  SW will continue to assist with pt's d/c needs.     "

## 2018-03-30 NOTE — DISCHARGE INSTRUCTIONS
Discharge Instructions    Discharged to home by car with escort. Discharged via wheelchair, hospital escort: Yes.  Special equipment needed: Not Applicable    Be sure to schedule a follow-up appointment with your primary care doctor or any specialists as instructed.     Discharge Plan:   Pneumococcal Vaccine Given - only chart on this line when given:  (refused)  Influenza Vaccine Indication: Patient Refuses    I understand that a diet low in cholesterol, fat, and sodium is recommended for good health. Unless I have been given specific instructions below for another diet, I accept this instruction as my diet prescription.   Other diet: Cardiac    Special Instructions:       · Is patient discharged on Warfarin / Coumadin?   No     Depression / Suicide Risk    As you are discharged from this [facility] facility, it is important to learn how to keep safe from harming yourself.    Recognize the warning signs:  · Abrupt changes in personality, positive or negative- including increase in energy   · Giving away possessions  · Change in eating patterns- significant weight changes-  positive or negative  · Change in sleeping patterns- unable to sleep or sleeping all the time   · Unwillingness or inability to communicate  · Depression  · Unusual sadness, discouragement and loneliness  · Talk of wanting to die  · Neglect of personal appearance   · Rebelliousness- reckless behavior  · Withdrawal from people/activities they love  · Confusion- inability to concentrate     If you or a loved one observes any of these behaviors or has concerns about self-harm, here's what you can do:  · Talk about it- your feelings and reasons for harming yourself  · Remove any means that you might use to hurt yourself (examples: pills, rope, extension cords, firearm)  · Get professional help from the community (Mental Health, Substance Abuse, psychological counseling)  · Do not be alone:Call your Safe Contact- someone whom you trust who will be  there for you.  · Call your local CRISIS HOTLINE 508-8162 or 508-969-3340  · Call your local Children's Mobile Crisis Response Team [place] (540) 132-9617 or www.ItsPlatonic  · Call the toll free National Suicide Prevention Hotlines   · National Suicide Prevention Lifeline 012-066-QCVR (3965)  · National SmartCare system Line Network 800-SUICIDE (304-4155)

## 2018-03-30 NOTE — PROGRESS NOTES
"DATE OF CONSULTATION: 3/28/18    REASON FOR CONSULTATION: Abnormal stress test    HISTORY OF PRESENT ILLNESS:   85 year old with a PMH of HTN, DLD, TIAx2 presented from [place] after having an abnormal NM- stress test at an outside facility. Patient reports having 2 week symptomology of substernal chest pain which is atypical in nature, aggravated with lying down, not associated with exertion, not associated with rest, and \"achy\" in nature. This is associated with shortness of breath, productive cough with white phlegm production, and exertional dyspnea. Denies any palpitations, dizziness, LOC, nausea, vomiting, blurry vision, or active tobacco abuse.     She has an extensive family Hx of CAD. Ex-smoker quit in the 60's.     Interval history:  -denies any chest pain SOB, N/V, bleeding overnight.  -Plavix and aspirin started yesterday        PAST MEDICAL HISTORY:  Past Medical History:   Diagnosis Date   • Abnormal Pap smear    • Arthritis    • ASTHMA    • CATARACT    • Depression    • Diabetes    • DVT (deep venous thrombosis) (CMS-[facility]) 1992    related to hip surgery right leg   • Glaucoma    • Headache(784.0)    • Hyperlipidemia    • Hypertension    • MRSA (methicillin resistant staph aureus) culture positive    • Thyroid disease    • TIA (transient ischemic attack)    • TIA (transient ischemic attack)     resolved with Agrenox   • Urinary tract infection, site not specified          SURGICAL HISTORY:  Past Surgical History:   Procedure Laterality Date   • OTHER      hysterectomy, hip replacement times 3 sinus surgery   • OTHER ORTHOPEDIC SURGERY Right     rtr hip x3, last on in 2006         SOCIAL HISTORY:  Social History     Social History   • Marital status: Single     Spouse name: N/A   • Number of children: N/A   • Years of education: N/A     Occupational History   • Not on file.     Social History Main Topics   • Smoking status: Former Smoker   • Smokeless tobacco: Never Used   • Alcohol use No   • Drug " use: No   • Sexual activity: Not on file     Other Topics Concern   • Not on file     Social History Narrative   • No narrative on file         FAMILY HISTORY:  Family History   Problem Relation Age of Onset   • Heart Disease Mother    • Heart Disease Father    • Heart Disease Brother    • Heart Disease Sister          HOME MEDICATIONS:   Prior to Admission medications    Medication Sig Start Date End Date Taking? Authorizing Provider   furosemide (LASIX) 20 MG Tab Take 20 mg by mouth every day.   Yes Physician Outpatient   fexofenadine (ALLEGRA) 180 MG tablet Take 180 mg by mouth every day.   Yes Physician Outpatient   cyanocobalamin (EQL VITAMIN B-12) 500 MCG tablet Take 1 Tab by mouth every day.    Physician Outpatient   aspirin-dipyridamole (AGGRENOX)  MG CAPSULE SR 12 HR Take 1 Cap by mouth 2 times a day.    Physician Outpatient   Multiple Vitamins-Minerals (PRESERVISION AREDS PO) Take 1 Tab by mouth 2 times a day.    Physician Outpatient   levothyroxine (SYNTHROID) 50 MCG Tab Take 50 mcg by mouth every day.    Physician Outpatient   metoprolol SR (TOPROL XL) 50 MG TB24 Take 50 mg by mouth every day.    Physician Outpatient   simvastatin (ZOCOR) 40 MG TABS Take 40 mg by mouth every evening.    Physician Outpatient   Linagliptin (TRADJENTA) 5 MG TABS Take 5 mg by mouth every day.    Physician Outpatient   travoprost (TRAVATAN Z) 0.004 % SOLN Place 1 Drop in both eyes every evening.    Physician Outpatient         REVIEW OF SYSTEMS:  Review of Systems   Constitutional: Negative for chills, diaphoresis and fever.   HENT: Negative for hearing loss.    Eyes: Negative for blurred vision.   Respiratory: Negative for cough, hemoptysis, sputum production and shortness of breath.    Cardiovascular: Positive for leg swelling. Negative for chest pain, palpitations, orthopnea and claudication.   Gastrointestinal: Negative for abdominal pain, constipation, diarrhea, nausea and vomiting.   Genitourinary: Negative for  "dysuria.   Musculoskeletal: Negative for myalgias.   Skin: Negative for rash.   Neurological: Negative for dizziness, focal weakness, loss of consciousness and weakness.   Psychiatric/Behavioral: The patient is not nervous/anxious.    All other systems reviewed and are negative.      PHYSICAL EXAMINATION:  Vitals:    03/29/18 2006 03/30/18 0017 03/30/18 0400 03/30/18 0820   BP: 109/68 116/67 117/67 136/68   Pulse: 70 73 71 74   Resp: 16 15 17 16   Temp: 36 °C (96.8 °F) 36.2 °C (97.1 °F) 36.3 °C (97.3 °F) 36.7 °C (98 °F)   SpO2: 93% 95% 90% 94%   Weight:       Height:         Body mass index is 36.47 kg/m².  /68   Pulse 74   Temp 36.7 °C (98 °F)   Resp 16   Ht 1.651 m (5' 5\")   Wt 99.4 kg (219 lb 2.2 oz)   SpO2 94%   Breastfeeding? No   BMI 36.47 kg/m²   O2 therapy: Pulse Oximetry: 94 %, O2 (LPM): 0, O2 Delivery: None (Room Air)    Physical Exam   Constitutional: She is oriented to person, place, and time and well-developed, well-nourished, and in no distress. No distress.   HENT:   Head: Normocephalic and atraumatic.   No carotid bruits   Eyes: EOM are normal.   Neck: Normal range of motion. Neck supple.   JVD cannot be assessed due to thick neck.    Cardiovascular: Normal rate, regular rhythm and normal heart sounds.  Exam reveals no gallop and no friction rub.    No murmur heard.  Pulmonary/Chest: Effort normal and breath sounds normal. No respiratory distress. She has no wheezes. She has no rales.   Abdominal: Soft. Bowel sounds are normal.   Musculoskeletal: Normal range of motion. She exhibits edema.   Neurological: She is alert and oriented to person, place, and time. GCS score is 15.   Skin: Skin is warm. She is not diaphoretic.   Psychiatric: Mood and affect normal.       LABORATORY DATA:     Recent Results (from the past 24 hour(s))   POC ACT (resulted by nursing)    Collection Time: 03/29/18  1:20 PM   Result Value Ref Range    Istat Activated Clotting Time 329 (H) 74 - 137 sec   ACCU-CHEK " GLUCOSE    Collection Time: 18  2:09 PM   Result Value Ref Range    Glucose - Accu-Ck 92 65 - 99 mg/dL   EKG STAT    Collection Time: 18  4:59 PM   Result Value Ref Range    Report       [facility]    Test Date:  2018  Pt Name:    ANNA WHITAKER               Department: 183  MRN:        [id number]                      Room:       [facility]  Gender:     Female                       Technician: [facility]  :        [id number]                   Requested By:DIETER TURNER  Order #:    [id number]                    Reading MD: Dieter Turner MD    Measurements  Intervals                                Axis  Rate:       61                           P:          19  SD:         240                          QRS:        4  QRSD:       84                           T:          58  QT:         412  QTc:        415    Interpretive Statements  SINUS RHYTHM  FIRST DEGREE AV BLOCK    Electronically Signed On 3- 22:35:13 PDT by Dieter Turner MD     ACCU-CHEK GLUCOSE    Collection Time: 18  5:15 PM   Result Value Ref Range    Glucose - Accu-Ck 152 (H) 65 - 99 mg/dL   ACCU-CHEK GLUCOSE    Collection Time: 18  8:16 PM   Result Value Ref Range    Glucose - Accu-Ck 127 (H) 65 - 99 mg/dL   Basic Metabolic Panel (BMP)    Collection Time: 18  2:24 AM   Result Value Ref Range    Sodium 139 135 - 145 mmol/L    Potassium 3.9 3.6 - 5.5 mmol/L    Chloride 107 96 - 112 mmol/L    Co2 23 20 - 33 mmol/L    Glucose 117 (H) 65 - 99 mg/dL    Bun 23 (H) 8 - 22 mg/dL    Creatinine 0.69 0.50 - 1.40 mg/dL    Calcium 8.9 8.5 - 10.5 mg/dL    Anion Gap 9.0 0.0 - 11.9   CBC without Differential    Collection Time: 18  2:24 AM   Result Value Ref Range    WBC 10.7 4.8 - 10.8 K/uL    RBC 4.20 4.20 - 5.40 M/uL    Hemoglobin 13.0 12.0 - 16.0 g/dL    Hematocrit 41.5 37.0 - 47.0 %    MCV 98.8 (H) 81.4 - 97.8 fL    MCH 31.0 27.0 - 33.0 pg    MCHC 31.3 (L) 33.6 - 35.0 g/dL    RDW 53.3 (H) 35.9 - 50.0 fL     Platelet Count 217 164 - 446 K/uL    MPV 9.5 9.0 - 12.9 fL   ESTIMATED GFR    Collection Time: 18  2:24 AM   Result Value Ref Range    GFR If African American >60 >60 mL/min/1.73 m 2    GFR If Non African American >60 >60 mL/min/1.73 m 2   ACCU-CHEK GLUCOSE    Collection Time: 18  5:37 AM   Result Value Ref Range    Glucose - Accu-Ck 123 (H) 65 - 99 mg/dL   EKG in AM    Collection Time: 18  6:31 AM   Result Value Ref Range    Report       [facility]    Test Date:  2018  Pt Name:    ANNA WHITAKER               Department: [facility]  MRN:        [id number]                      Room:       T808  Gender:     Female                       Technician: [facility]  :        [id number]                   Requested By:DIETER TURNER  Order #:    [id number]                    Reading MD: Dieter Turner MD    Measurements  Intervals                                Axis  Rate:       69                           P:          58  TN:         236                          QRS:        11  QRSD:       84                           T:          67  QT:         396  QTc:        425    Interpretive Statements  SINUS RHYTHM  FIRST DEGREE AV BLOCK  Compared to ECG 2018 16:59:17  No significant changes    Electronically Signed On 3- 6:46:59 PDT by Dieter Turner MD         ECHOCARDIOGRAM COMP W/O CONT   Final Result      DX-CHEST-PORTABLE (1 VIEW)   Final Result      1.  Stable mild cardiomegaly.   2.  Mild pulmonary vascular congestion.   3.  Minimal LEFT midlung atelectasis or scar.   4.  No pneumonia or pulmonary edema.          ASSESSMENT AND PLAN:     //Unstable angina - resolved  //Positive stress test for ischemia  // LAD stenosis s/p SALLY  //Hx of tobacco abuse  //HTN  //DLD  //NIDDM  //Obesity  //Hx of TIA's x 2        - Zocor 40 mg OD. Normal lipid panel  - Metoprolol XL 50 OD   - Lasix 20 mg OD  - DAPT with ASA and Plavix.   - Patient is cleared from cardiology standpoint. Patient educated on  the importance of compliance with DAPT therapy.       Thank you for the consult. Cardiology will sign off.     Quality Measures  Quality-Core Measures

## 2018-03-30 NOTE — DISCHARGE PLANNING
Received choice form from [facility], referral has been faxed to [facility] at [phone] on 03/30/18

## 2018-03-30 NOTE — DISCHARGE SUMMARY
CHIEF COMPLAINT ON ADMISSION  Chief Complaint   Patient presents with   • Other     Abnormal Stress Test       CODE STATUS  Full Code    HPI & HOSPITAL COURSE  This is a 85 y.o. female with history of type II diabetes, hypertension, dyslipidemia, CKD here with chest pain and abnormal recent nuclear medicine cardiac stress test, revealing reversible ischemia. For details see H&P note.  Patient evaluated by cardiology service and undergone cardiac catheterization on March 29 revealing left anterior descending artery mid vessel 90% stenosis and left ventricular ejection fraction 72%. Coronary stent implanted in the mid left anterior descending artery. Patient chest pain resolved.   She was started on antibiotic for cystitis, to continue after discharge for 5 days.   Patient to be discharged home to continue Zocor, metoprolol, Lasix, aspirin, Plavix, DAPT diet, to follow-up with cardiology and PCP. Home health service referral was sent    The patient met 2-midnight criteria for an inpatient stay at the time of discharge.    Therefore, she is discharged in fair and stable condition with close outpatient follow-up.    SPECIFIC OUTPATIENT FOLLOW-UP  pcp  cardiology    DISCHARGE PROBLEM LIST  Active Problems:    Chest pain POA: Yes    CKD (chronic kidney disease) POA: Yes    Type 2 diabetes mellitus (CMS-[facility]) POA: Yes    Hypertension POA: Unknown    Hyperlipidemia POA: Unknown    UTI (urinary tract infection) POA: Unknown  Resolved Problems:    * No resolved hospital problems. *      FOLLOW UP  No future appointments.  Maliha Tran M.D.  [address] [place] [address]  [phone]    Schedule an appointment as soon as possible for a visit in 1 week  Hospital follow-up appointment with PCP    [facility] ([facility])  [address] [place] [address]  [phone]          MEDICATIONS ON DISCHARGE   Johanne Baxter   Home Medication Instructions ELGIN:[id number]    Printed on:03/30/18  1341   Medication Information                      aspirin (ASA) 81 MG Chew Tab chewable tablet  Take 1 Tab by mouth every day.             cefUROXime (CEFTIN) 250 MG Tab  Take 1 Tab by mouth 2 times a day for 4 days.             clopidogrel (PLAVIX) 75 MG Tab  Take 1 Tab by mouth every day.             cyanocobalamin (EQL VITAMIN B-12) 500 MCG tablet  Take 1 Tab by mouth every day.             fexofenadine (ALLEGRA) 180 MG tablet  Take 180 mg by mouth every day.             furosemide (LASIX) 20 MG Tab  Take 20 mg by mouth every day.             levothyroxine (SYNTHROID) 50 MCG Tab  Take 50 mcg by mouth every day.             Linagliptin (TRADJENTA) 5 MG TABS  Take 5 mg by mouth every day.             metoprolol SR (TOPROL XL) 50 MG TB24  Take 50 mg by mouth every day.             Multiple Vitamins-Minerals (PRESERVISION AREDS PO)  Take 1 Tab by mouth 2 times a day.             simvastatin (ZOCOR) 40 MG TABS  Take 40 mg by mouth every evening.             travoprost (TRAVATAN Z) 0.004 % SOLN  Place 1 Drop in both eyes every evening.                 DIET  Orders Placed This Encounter   Procedures   • Diet Order     Standing Status:   Standing     Number of Occurrences:   1     Order Specific Question:   Diet:     Answer:   Cardiac [6]       ACTIVITY  As tolerated.        CONSULTATIONS  Cardiology    PROCEDURES  Coronary angiography, left heart catheterization    LABORATORY  Lab Results   Component Value Date/Time    SODIUM 139 03/30/2018 02:24 AM    POTASSIUM 3.9 03/30/2018 02:24 AM    CHLORIDE 107 03/30/2018 02:24 AM    CO2 23 03/30/2018 02:24 AM    GLUCOSE 117 (H) 03/30/2018 02:24 AM    BUN 23 (H) 03/30/2018 02:24 AM    CREATININE 0.69 03/30/2018 02:24 AM        Lab Results   Component Value Date/Time    WBC 10.7 03/30/2018 02:24 AM    HEMOGLOBIN 13.0 03/30/2018 02:24 AM    HEMATOCRIT 41.5 03/30/2018 02:24 AM    PLATELETCT 217 03/30/2018 02:24 AM      ECHOCARDIOGRAM COMP W/O CONT   Final Result       DX-CHEST-PORTABLE (1 VIEW)   Final Result      1.  Stable mild cardiomegaly.   2.  Mild pulmonary vascular congestion.   3.  Minimal LEFT midlung atelectasis or scar.   4.  No pneumonia or pulmonary edema.        Echo  CONCLUSIONS  Normal left ventricular systolic function.  Moderate concentric left ventricular hypertrophy.  Mitral annular calcification.  Aortic sclerosis without stenosis.  Moderate tricuspid regurgitation.  Right ventricular systolic pressure is estimated to be 60 mmHg.  No prior study is available for comparison.      Total time of the discharge process exceeds 45 minutes

## 2018-03-30 NOTE — PROCEDURES
DATE OF SERVICE:  03/29/2018    PROCEDURE:  Cardiac catheterization with percutaneous coronary intervention.    A.  Left heart catheterization.  B.   Left ventriculography.  C.  Selective coronary angiography.  D.  Coronary stent implantation of the mid left anterior descending artery   with a 3.0x18 mm Xience drug-eluting stent, postdilated up to 3.75 mm.  E.  Right radial artery approach.    CONSCIOUS SEDATION SUPERVISION:  40 minutes.    PREPROCEDURE DIAGNOSES:  1.  Unstable angina pectoris.  2.  Abnormal myocardial perfusion stress test.    POSTPROCEDURE DIAGNOSES:  1.  Left anterior descending artery, mid vessel 90% stenosis.  2.  Left ventricular ejection fraction 72%.    PHYSICIAN:  Dieter Ely MD    REFERRING PHYSICIAN:  Cole Anderson MD    COMPLICATIONS:  None.    MEDICATIONS:  1.  Versed 2 mg IV.  2.  Fentanyl 125 mcg IV.  3.  Lidocaine 2% subcutaneous.  4.  Heparin 3000 units IA.  5.  Nitroglycerin 100 mcg IA.  6.  Verapamil 2.5 mg IA.  7.  Heparin 3000 units IA.  8.  Nitroglycerin 450 mcg intracoronary in divided doses.  9.  Plavix 600 mg p.o.    INDICATIONS:  The patient is an 85-year-old  female with a history of   diabetes mellitus, hypertension, and recurrent TIAs on Aggrenox, and   hyperlipidemia, who has had a 2-week history of unstable angina pectoris with   shortness of breath, who was transferred from [facility] in   [place], [place] to [facility] for evaluation.  The patient had   an abnormal myocardial perfusion scan in [place] that showed lateral   ischemia.  The patient is referred for diagnostic cardiac catheterization.    PROCEDURE IN DETAIL:  After informed consent was obtained, the patient was   brought to the cardiac catheterization laboratory, where she was prepped,   draped, and anesthetized in the usual manner.    After having established the presence of adequate collateral circulation to   the right hand with a pressure and  oximetry-guided Ray's test, the volar   surface of the right wrist was prepped, draped, and anesthetized in usual   manner.    Using a modified Seldinger technique, a 6-Senegalese x10 cm introducer sheath was   inserted in the right radial artery.  Heparin, verapamil, and nitroglycerin   were given via the side port.    Next, the 4-Senegalese JL 3.5 left coronary catheter was advanced in the ascending   aorta with the assistance of a 0.03 Glidewire baby J angled with all subsequent   catheter exchanges done over 0.035 J tipped exchange length wire.  The JL 3.5   catheter was advanced in the ostium of the right coronary artery, and right   coronary angiograms were obtained in various projections.    Next, using the same catheter, the left anterior descending artery was   cannulated, and angiograms were obtained.    Next, a 4-Senegalese pigtail catheter was advanced into left ventricle under   fluoroscopic guidance.  A single plane LUCIA left ventricular angiogram was   performed.  Pre and post angiogram LVEDP, LV, and aortic pressures were   obtained.    Initial review of the coronary angiograms demonstrated focal 90% stenosis of   mid left anterior descending artery immediately adjacent to the first diagonal   branch.    Next, additional heparin was given with subsequent ACT of 329.    Next, a 6-Senegalese extra backup 3.5 guiding catheter was advanced in the ostium   of the left coronary artery.    Next, a 0.014 Whisper wire was negotiated into the distal left anterior   descending artery.    Next, a 2.5x15 mm balloon catheter was inserted across the midvessel stenosis   and inflated up to 13 atmospheres.    Next, a 3.0x18 mm Xience drug-eluting stent was placed across the mid-vessel   stenosis and deployed at 14 atmospheres with the post-deployment inflation of   15 atmospheres.  The balloon was removed.    Next, a 3.5x12 mm noncompliant balloon catheter was inserted and inflations of   15 and 17 atmospheres were  performed.    Next, a 3.75x15 mm noncompliant balloon catheter was inserted and inflations   of 12 atmospheres were performed within the stent.  The balloon and wire   removed.  IC nitroglycerin was given, and final angiograms were performed.    At the end of procedure, catheters were removed.  Hemostasis was achieved with   wrist band device.  The patient tolerated the procedure well.    FINDINGS:    HEMODYNAMICS:    Left heart pressures:  1.  LVEDP 25 mmHg.  2.  Left ventricular systolic pressure 120 mmHg.  3.  Central aortic pressure systolic 120, diastolic 59, mean of 81 mmHg.    LEFT VENTRICULOGRAPHY:  Left ventricular chamber size, wall motion, and   systolic function are normal.  Calculated ejection fraction of 72%.    CORONARY ARTERIOGRAPHY:  1.  Left main artery:  Left main vessel is angiographically normal and   bifurcates to a left anterior descending artery and circumflex artery.  2.  Left anterior descending artery:  The left anterior descending artery   gives rise to a first septal branch and major diagonal branch and then extends  around the apex.  The mid left anterior descending artery has 90% stenosis   adjacent to and immediately distal to the first diagonal branch.  3.  Circumflex artery:  The circumflex is dominant and gives rise to a bifurcating   first marginal branch and a series of additional small marginal branches, a  posterolateral branch, and the posterior descending artery.  The circumflex  artery is widely patent with mild smooth intimal atheroma.  4.  Right coronary artery:  The right coronary artery is nondominant, gives   rise to a conus branch, a small right ventricular branches, and a small   posterolateral branch.  Angiographically, the right coronary artery is widely   patent with some mild smooth and focal intimal atheroma.    POST-STENT IMPLANTATION of the mid left anterior descending artery with a   3.0x18 mm Xience drug-eluting stent postdilated up to 3.75 mm, demonstrates    good stent expansion, 0% residual stenosis, no evidence of dissection or   thrombus, and TIMI3 antegrade flow.    PLAN:  1.  Maximize optimal medical therapy for the patient's coronary artery disease   post coronary stent implantation.  2.  Aggrenox will be discontinued.  Dual antiplatelet therapy with aspirin and   Plavix will be initiated.       ____________________________________     MD TULIO CASTRO / WILLIAM    DD:  03/29/2018 19:20:24  DT:  03/29/2018 20:45:13    D#:  [id number]  Job#:  [id number]

## 2018-03-30 NOTE — CARE PLAN
Problem: Communication  Goal: The ability to communicate needs accurately and effectively will improve  Outcome: PROGRESSING AS EXPECTED  Call light within reach, verbalizes how to use    Problem: Safety  Goal: Will remain free from falls  Outcome: PROGRESSING AS EXPECTED  Patient has not fallen this stay

## 2018-03-30 NOTE — DISCHARGE PLANNING
Received call from Renetta at [facility], they will accept patient in resume of service.  Per Renetta they will need a new order stating they can resume care on 04-02-18.  Message left for [facility].

## 2018-03-30 NOTE — DISCHARGE PLANNING
Pt reports she works with [employer] and would like to continue.  SW faxed choice form to [facility].  Pt reports she has a case manage, Horace Solorio at [facility] and would like SW to contact.  [facility] contacted Horace VELAZQUEZ, [phone] to discuss pt's d/c placement.  CM reports pt could call the [employer], granddaughter or use [facility] for transportation services.     Plan:  IRMA will communicate transportation options.  SW will continue to assist with pt's d/c needs.

## 2019-07-16 ENCOUNTER — TELEPHONE (OUTPATIENT)
Dept: CARDIOLOGY | Facility: MEDICAL CENTER | Age: 84
End: 2019-07-16

## 2019-07-16 NOTE — TELEPHONE ENCOUNTER
Dr. Ross, from [facility] in [place] is ordering an brain MRI on patient due to abrupt mentation changes. The imaging department requires cardiac clearance for stent that patient has.     Dr. Ross would like call back.     [phone] Ext [phone]  Personal Cell [phone]      To SW - any issue with stent and MRI (brain MRI)?

## 2019-12-16 ENCOUNTER — TELEMEDICINE2 (OUTPATIENT)
Dept: INFECTIOUS DISEASES | Facility: MEDICAL CENTER | Age: 84
End: 2019-12-16
Payer: MEDICARE

## 2019-12-16 VITALS
SYSTOLIC BLOOD PRESSURE: 146 MMHG | DIASTOLIC BLOOD PRESSURE: 73 MMHG | OXYGEN SATURATION: 91 % | TEMPERATURE: 98.3 F | HEIGHT: 65 IN | BODY MASS INDEX: 32.65 KG/M2 | HEART RATE: 71 BPM | WEIGHT: 196 LBS

## 2019-12-16 DIAGNOSIS — N81.10 BLADDER PROLAPSE, FEMALE, ACQUIRED: ICD-10-CM

## 2019-12-16 DIAGNOSIS — N39.0 URINARY TRACT INFECTION WITHOUT HEMATURIA, SITE UNSPECIFIED: ICD-10-CM

## 2019-12-16 DIAGNOSIS — Z22.358 ESBL ESCHERICHIA COLI CARRIER: ICD-10-CM

## 2019-12-16 PROCEDURE — 99202 OFFICE O/P NEW SF 15 MIN: CPT | Mod: GT | Performed by: INTERNAL MEDICINE

## 2019-12-16 RX ORDER — FOLIC ACID 1 MG/1
1 TABLET ORAL DAILY
COMMUNITY

## 2019-12-16 NOTE — PROGRESS NOTES
DATE OF SERVICE:  12/16/2019     REFERRING PHYSICIAN:  Maliha Tran M.D.     REASON FOR CONSULTATION: MRSA skin and soft tissue infection and ESBL UTI     HISTORY OF PRESENT ILLNESS:  Patient is a 87 y.o. female who has a past medical history of diabetes mellitus, coronary artery disease status post cardiac stent placement was found to have ESBL in her urine.  She also had right facial submandibular lesion which was nonhealing.  She was told she has a MRSA in it.  In view of all these issues she was referred to infectious diseases.  She is being seen via telemedicine.     REVIEW OF SYSTEMS:   Constitutional: Negative for fever and malaise/fatigue.   HENT: Negative for hearing loss and visual changes .  Says had the lesion on her right submandibular region which has resolved since.  Eyes: Negative for blurred vision, double vision and photophobia.   Respiratory: Negative for cough.   Cardiovascular: Negative for chest pain and leg swelling.   Gastrointestinal: Negative for nausea, vomiting and diarrhea.   Musculoskeletal: Negative for myalgias and back pain.   Skin: Negative for rash.   Neurological: Negative for sensory change, focal weakness and headaches.   Genitourinary-complains of bladder prolapse.  Complains of incontinence.  Sometimes it hurts to pee.  ALLERGIES:    Allergies   Allergen Reactions   • Novocain      Pt was unsure of the rxn   • Pcn [Penicillins]      Pt reported it makes her feel drunk   • Sulfa Drugs      Pt was unsure of rxn        PAST MEDICAL HISTORY:   Past Medical History:   Diagnosis Date   • Abnormal Pap smear    • Arthritis    • ASTHMA    • CATARACT    • Depression    • Diabetes    • DVT (deep venous thrombosis) (HCC) 1992    related to hip surgery right leg   • Glaucoma    • Headache(784.0)    • Hyperlipidemia    • Hypertension    • MRSA (methicillin resistant staph aureus) culture positive    • Thyroid disease    • TIA (transient ischemic attack)    • TIA (transient ischemic attack)      resolved with Agrenox   • Urinary tract infection, site not specified        PAST SURGICAL HISTORY:    Past Surgical History:   Procedure Laterality Date   • OTHER      hysterectomy, hip replacement times 3 sinus surgery   • OTHER ORTHOPEDIC SURGERY Right     rtr hip x3, last on in 2006       FAMILY HISTORY:    Family History   Problem Relation Age of Onset   • Heart Disease Mother    • Heart Disease Father    • Heart Disease Brother    • Heart Disease Sister         SOCIAL HISTORY:    Social History     Socioeconomic History   • Marital status: Single     Spouse name: Not on file   • Number of children: Not on file   • Years of education: Not on file   • Highest education level: Not on file   Occupational History   • Not on file   Social Needs   • Financial resource strain: Not on file   • Food insecurity:     Worry: Not on file     Inability: Not on file   • Transportation needs:     Medical: Not on file     Non-medical: Not on file   Tobacco Use   • Smoking status: Former Smoker   • Smokeless tobacco: Never Used   Substance and Sexual Activity   • Alcohol use: No   • Drug use: No   • Sexual activity: Not on file   Lifestyle   • Physical activity:     Days per week: Not on file     Minutes per session: Not on file   • Stress: Not on file   Relationships   • Social connections:     Talks on phone: Not on file     Gets together: Not on file     Attends Hinduism service: Not on file     Active member of club or organization: Not on file     Attends meetings of clubs or organizations: Not on file     Relationship status: Not on file   • Intimate partner violence:     Fear of current or ex partner: Not on file     Emotionally abused: Not on file     Physically abused: Not on file     Forced sexual activity: Not on file   Other Topics Concern   • Not on file   Social History Narrative   • Not on file        MEDICATIONS:   Current Outpatient Medications   Medication Sig Dispense Refill   • folic acid (FOLVITE) 1 MG  "Tab Take 1 mg by mouth every day.     • aspirin (ASA) 81 MG Chew Tab chewable tablet Take 1 Tab by mouth every day. 100 Tab 0   • clopidogrel (PLAVIX) 75 MG Tab Take 1 Tab by mouth every day. 30 Tab 1   • furosemide (LASIX) 20 MG Tab Take 20 mg by mouth every day.     • fexofenadine (ALLEGRA) 180 MG tablet Take 180 mg by mouth every day.     • cyanocobalamin (EQL VITAMIN B-12) 500 MCG tablet Take 1 Tab by mouth every day.     • metoprolol SR (TOPROL XL) 50 MG TB24 Take 50 mg by mouth every day.     • simvastatin (ZOCOR) 40 MG TABS Take 40 mg by mouth every evening.     • Linagliptin (TRADJENTA) 5 MG TABS Take 5 mg by mouth every day.     • travoprost (TRAVATAN Z) 0.004 % SOLN Place 1 Drop in both eyes every evening.     • Multiple Vitamins-Minerals (PRESERVISION AREDS PO) Take 1 Tab by mouth 2 times a day.     • levothyroxine (SYNTHROID) 50 MCG Tab Take 50 mcg by mouth every day.       No current facility-administered medications for this visit.         LABORATORY DATA: Urine culture ESBL     PHYSICAL EXAMINATION:PE:      /73   Pulse 71   Temp 36.8 °C (98.3 °F) (Temporal)   Ht 1.651 m (5' 5\")   Wt 88.9 kg (196 lb)   SpO2 91%   Breastfeeding? No   BMI 32.62 kg/m²        Constitutional: Patient is oriented to person, place, and time.  she appears well-developed and well-nourished. No distress  Head and ENT -poor dentition.  No purulent lesions appreciated on the  neurological:  she is alert and oriented to person, place, and time. No cranial nerve deficit. Coordination normal.   Skin: Skin is warm and dry. Good turgor. No rashes visable.  Psychiatric:  she has a normal mood and affect.  She started crying       ASSESSMENT:  1. Urinary tract infection without hematuria, site unspecified     2. 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 Bladder prolapse, female, acquired          RECOMMENDATIONS:      At this time there is no MRSA lesion which needs to be treated.  She has bladder prolapse which causes a lot of " her symptoms.  I do not recommend treatment of ESBL in the urine if she is asymptomatic.  Only treat when she actually has symptoms.  She was encouraged to go to the urologist for her prolapse.  Regarding her penicillin allergy it seems it was 50 years ago.  And she says she has tolerated amoxicillin in the past.  Hence if she truly needs antibiotics for ESBL infection she can be given meropenem.  Patient also feels depressed.  She wants to see a psychiatrist.  I will defer that to her primary care physician.

## 2022-12-14 NOTE — PROGRESS NOTES
Date of Surgery Update:  Jose Carlos Shaikh was seen, history and physical examination reviewed, and patient examined by me today. There have been no significant clinical changes since the completion of the previous history and physical.    The risk, benefits, and alternatives of the proposed procedure have been explained to the patient (or appropriate guardian) and understanding verbalized. All questions answered. Patient wishes to proceed.     Electronically signed by: Kendra Matthew DPM,12/14/2022,7:20 AM Patient arrived to unit via gurney on [facility] with ED nurses. Patient ambulated to bed, placed on tele with NSR reported per tele tech. Personal belongings placed in room.  Admit profile and  Orientation completed, call light in reach. Will continue to monitor.